# Patient Record
Sex: FEMALE | Race: WHITE | Employment: FULL TIME | URBAN - METROPOLITAN AREA
[De-identification: names, ages, dates, MRNs, and addresses within clinical notes are randomized per-mention and may not be internally consistent; named-entity substitution may affect disease eponyms.]

---

## 2018-06-27 ENCOUNTER — HOSPITAL ENCOUNTER (EMERGENCY)
Age: 53
Discharge: HOME | End: 2018-06-27
Payer: COMMERCIAL

## 2018-06-27 VITALS
DIASTOLIC BLOOD PRESSURE: 104 MMHG | WEIGHT: 195 LBS | BODY MASS INDEX: 35.67 KG/M2 | TEMPERATURE: 98.3 F | RESPIRATION RATE: 14 BRPM | SYSTOLIC BLOOD PRESSURE: 173 MMHG | OXYGEN SATURATION: 99 % | HEART RATE: 100 BPM

## 2018-06-27 DIAGNOSIS — R10.9 FLANK PAIN: Primary | ICD-10-CM

## 2018-06-27 DIAGNOSIS — Z87.442 HISTORY OF KIDNEY STONES: ICD-10-CM

## 2018-06-27 LAB
BACTERIA: ABNORMAL /HPF
BILIRUBIN URINE: NEGATIVE
BLOOD, URINE: ABNORMAL
CLARITY: CLEAR
COLOR: YELLOW
EPITHELIAL CELLS, UA: ABNORMAL /HPF
GLUCOSE URINE: NEGATIVE MG/DL
KETONES, URINE: NEGATIVE MG/DL
LEUKOCYTE ESTERASE, URINE: ABNORMAL
NITRITE, URINE: NEGATIVE
PH UA: 5 (ref 5–9)
PROTEIN UA: 30 MG/DL
RBC UA: ABNORMAL /HPF (ref 0–2)
SPECIFIC GRAVITY UA: 1.01 (ref 1–1.03)
UROBILINOGEN, URINE: 0.2 E.U./DL
WBC UA: ABNORMAL /HPF (ref 0–5)

## 2018-06-27 PROCEDURE — 99212 OFFICE O/P EST SF 10 MIN: CPT

## 2018-06-27 PROCEDURE — 81001 URINALYSIS AUTO W/SCOPE: CPT

## 2018-06-27 RX ORDER — TAMSULOSIN HYDROCHLORIDE 0.4 MG/1
0.4 CAPSULE ORAL DAILY
Qty: 30 CAPSULE | Refills: 0 | Status: SHIPPED | OUTPATIENT
Start: 2018-06-27 | End: 2020-09-17 | Stop reason: ALTCHOICE

## 2018-06-27 RX ORDER — KETOROLAC TROMETHAMINE 10 MG/1
10 TABLET, FILM COATED ORAL EVERY 6 HOURS PRN
Qty: 20 TABLET | Refills: 0 | Status: ON HOLD | OUTPATIENT
Start: 2018-06-27 | End: 2018-07-10 | Stop reason: HOSPADM

## 2018-06-27 ASSESSMENT — PAIN SCALES - GENERAL: PAINLEVEL_OUTOF10: 5

## 2018-06-27 ASSESSMENT — PAIN DESCRIPTION - FREQUENCY: FREQUENCY: CONTINUOUS

## 2018-06-27 ASSESSMENT — PAIN DESCRIPTION - ORIENTATION: ORIENTATION: RIGHT

## 2018-06-27 ASSESSMENT — PAIN DESCRIPTION - LOCATION: LOCATION: FLANK

## 2018-06-27 ASSESSMENT — PAIN DESCRIPTION - DESCRIPTORS: DESCRIPTORS: ACHING

## 2018-06-27 ASSESSMENT — PAIN DESCRIPTION - PAIN TYPE: TYPE: ACUTE PAIN

## 2018-07-07 ENCOUNTER — HOSPITAL ENCOUNTER (INPATIENT)
Age: 53
LOS: 3 days | Discharge: HOME | DRG: 872 | End: 2018-07-10
Attending: EMERGENCY MEDICINE | Admitting: INTERNAL MEDICINE
Payer: COMMERCIAL

## 2018-07-07 ENCOUNTER — ANESTHESIA (OUTPATIENT)
Dept: OPERATING ROOM | Age: 53
DRG: 872 | End: 2018-07-07
Payer: COMMERCIAL

## 2018-07-07 ENCOUNTER — ANESTHESIA EVENT (OUTPATIENT)
Dept: OPERATING ROOM | Age: 53
DRG: 872 | End: 2018-07-07
Payer: COMMERCIAL

## 2018-07-07 ENCOUNTER — APPOINTMENT (OUTPATIENT)
Dept: GENERAL RADIOLOGY | Age: 53
DRG: 872 | End: 2018-07-07
Payer: COMMERCIAL

## 2018-07-07 ENCOUNTER — APPOINTMENT (OUTPATIENT)
Dept: CT IMAGING | Age: 53
DRG: 872 | End: 2018-07-07
Payer: COMMERCIAL

## 2018-07-07 DIAGNOSIS — N10 ACUTE PYELONEPHRITIS: ICD-10-CM

## 2018-07-07 DIAGNOSIS — N13.9 ACUTE BILATERAL OBSTRUCTIVE UROPATHY: Primary | ICD-10-CM

## 2018-07-07 DIAGNOSIS — A41.9 SEPSIS, DUE TO UNSPECIFIED ORGANISM: ICD-10-CM

## 2018-07-07 DIAGNOSIS — N17.9 ACUTE RENAL FAILURE, UNSPECIFIED ACUTE RENAL FAILURE TYPE (HCC): ICD-10-CM

## 2018-07-07 DIAGNOSIS — N20.0 NEPHROLITHIASIS: ICD-10-CM

## 2018-07-07 DIAGNOSIS — N23 RENAL COLIC: ICD-10-CM

## 2018-07-07 DIAGNOSIS — E87.5 HYPERKALEMIA: ICD-10-CM

## 2018-07-07 DIAGNOSIS — R52 PAIN: ICD-10-CM

## 2018-07-07 LAB
ANION GAP SERPL CALCULATED.3IONS-SCNC: 43 MMOL/L (ref 7–16)
BASOPHILS ABSOLUTE: 0.01 E9/L (ref 0–0.2)
BASOPHILS RELATIVE PERCENT: 0.1 % (ref 0–2)
BUN BLDV-MCNC: 102 MG/DL (ref 6–20)
CALCIUM SERPL-MCNC: 8.8 MG/DL (ref 8.6–10.2)
CHLORIDE BLD-SCNC: 79 MMOL/L (ref 98–107)
CHP ED QC CHECK: YES
CO2: 8 MMOL/L (ref 22–29)
CREAT SERPL-MCNC: 9.5 MG/DL (ref 0.5–1)
EOSINOPHILS ABSOLUTE: 0 E9/L (ref 0.05–0.5)
EOSINOPHILS RELATIVE PERCENT: 0 % (ref 0–6)
GFR AFRICAN AMERICAN: 5
GFR NON-AFRICAN AMERICAN: 4 ML/MIN/1.73
GLUCOSE BLD-MCNC: 162 MG/DL (ref 74–109)
GLUCOSE BLD-MCNC: 208 MG/DL
HCT VFR BLD CALC: 31.2 % (ref 34–48)
HEMOGLOBIN: 10.8 G/DL (ref 11.5–15.5)
IMMATURE GRANULOCYTES #: 0.07 E9/L
IMMATURE GRANULOCYTES %: 0.5 % (ref 0–5)
LACTIC ACID: 8.8 MMOL/L (ref 0.5–2.2)
LYMPHOCYTES ABSOLUTE: 1.25 E9/L (ref 1.5–4)
LYMPHOCYTES RELATIVE PERCENT: 8.6 % (ref 20–42)
MCH RBC QN AUTO: 29.4 PG (ref 26–35)
MCHC RBC AUTO-ENTMCNC: 34.6 % (ref 32–34.5)
MCV RBC AUTO: 85 FL (ref 80–99.9)
MONOCYTES ABSOLUTE: 1.13 E9/L (ref 0.1–0.95)
MONOCYTES RELATIVE PERCENT: 7.8 % (ref 2–12)
NEUTROPHILS ABSOLUTE: 12.09 E9/L (ref 1.8–7.3)
NEUTROPHILS RELATIVE PERCENT: 83 % (ref 43–80)
PDW BLD-RTO: 12.9 FL (ref 11.5–15)
PLATELET # BLD: 300 E9/L (ref 130–450)
PMV BLD AUTO: 9.8 FL (ref 7–12)
POC ANION GAP: 28
POC BUN: 97
POC CHLORIDE: 97
POC CO2: 13
POC CREATININE: 9.3
POC POTASSIUM: 6
POC SODIUM: 131
POTASSIUM REFLEX MAGNESIUM: 7.3 MMOL/L (ref 3.5–5)
RBC # BLD: 3.67 E12/L (ref 3.5–5.5)
SODIUM BLD-SCNC: 130 MMOL/L (ref 132–146)
WBC # BLD: 14.6 E9/L (ref 4.5–11.5)

## 2018-07-07 PROCEDURE — 6360000002 HC RX W HCPCS: Performed by: EMERGENCY MEDICINE

## 2018-07-07 PROCEDURE — 2500000003 HC RX 250 WO HCPCS: Performed by: EMERGENCY MEDICINE

## 2018-07-07 PROCEDURE — 84484 ASSAY OF TROPONIN QUANT: CPT

## 2018-07-07 PROCEDURE — 7100000000 HC PACU RECOVERY - FIRST 15 MIN: Performed by: UROLOGY

## 2018-07-07 PROCEDURE — 3600000003 HC SURGERY LEVEL 3 BASE: Performed by: UROLOGY

## 2018-07-07 PROCEDURE — BT141ZZ FLUOROSCOPY OF KIDNEYS, URETERS AND BLADDER USING LOW OSMOLAR CONTRAST: ICD-10-PCS | Performed by: UROLOGY

## 2018-07-07 PROCEDURE — 2580000003 HC RX 258: Performed by: NURSE ANESTHETIST, CERTIFIED REGISTERED

## 2018-07-07 PROCEDURE — 3700000001 HC ADD 15 MINUTES (ANESTHESIA): Performed by: UROLOGY

## 2018-07-07 PROCEDURE — 99292 CRITICAL CARE ADDL 30 MIN: CPT

## 2018-07-07 PROCEDURE — 96375 TX/PRO/DX INJ NEW DRUG ADDON: CPT

## 2018-07-07 PROCEDURE — 74176 CT ABD & PELVIS W/O CONTRAST: CPT

## 2018-07-07 PROCEDURE — 87088 URINE BACTERIA CULTURE: CPT

## 2018-07-07 PROCEDURE — 3700000000 HC ANESTHESIA ATTENDED CARE: Performed by: UROLOGY

## 2018-07-07 PROCEDURE — 87040 BLOOD CULTURE FOR BACTERIA: CPT

## 2018-07-07 PROCEDURE — 2580000003 HC RX 258: Performed by: EMERGENCY MEDICINE

## 2018-07-07 PROCEDURE — 85025 COMPLETE CBC W/AUTO DIFF WBC: CPT

## 2018-07-07 PROCEDURE — 6360000004 HC RX CONTRAST MEDICATION: Performed by: UROLOGY

## 2018-07-07 PROCEDURE — 96374 THER/PROPH/DIAG INJ IV PUSH: CPT

## 2018-07-07 PROCEDURE — C2617 STENT, NON-COR, TEM W/O DEL: HCPCS | Performed by: UROLOGY

## 2018-07-07 PROCEDURE — 3600000013 HC SURGERY LEVEL 3 ADDTL 15MIN: Performed by: UROLOGY

## 2018-07-07 PROCEDURE — 74400 UROGRAPHY IV +-KUB TOMOG: CPT

## 2018-07-07 PROCEDURE — 80048 BASIC METABOLIC PNL TOTAL CA: CPT

## 2018-07-07 PROCEDURE — 94640 AIRWAY INHALATION TREATMENT: CPT

## 2018-07-07 PROCEDURE — 6360000002 HC RX W HCPCS: Performed by: NURSE ANESTHETIST, CERTIFIED REGISTERED

## 2018-07-07 PROCEDURE — 51702 INSERT TEMP BLADDER CATH: CPT

## 2018-07-07 PROCEDURE — 36415 COLL VENOUS BLD VENIPUNCTURE: CPT

## 2018-07-07 PROCEDURE — 2709999900 HC NON-CHARGEABLE SUPPLY: Performed by: UROLOGY

## 2018-07-07 PROCEDURE — 99291 CRITICAL CARE FIRST HOUR: CPT

## 2018-07-07 PROCEDURE — 93005 ELECTROCARDIOGRAM TRACING: CPT | Performed by: EMERGENCY MEDICINE

## 2018-07-07 PROCEDURE — 83605 ASSAY OF LACTIC ACID: CPT

## 2018-07-07 PROCEDURE — C1758 CATHETER, URETERAL: HCPCS | Performed by: UROLOGY

## 2018-07-07 PROCEDURE — 6370000000 HC RX 637 (ALT 250 FOR IP): Performed by: EMERGENCY MEDICINE

## 2018-07-07 PROCEDURE — 7100000001 HC PACU RECOVERY - ADDTL 15 MIN: Performed by: UROLOGY

## 2018-07-07 PROCEDURE — 2000000000 HC ICU R&B

## 2018-07-07 PROCEDURE — 0T788DZ DILATION OF BILATERAL URETERS WITH INTRALUMINAL DEVICE, VIA NATURAL OR ARTIFICIAL OPENING ENDOSCOPIC: ICD-10-PCS | Performed by: UROLOGY

## 2018-07-07 DEVICE — UNIVERSA FIRM URETERAL STENT AND POSITIONER WITH HYDROPHILIC COATING
Type: IMPLANTABLE DEVICE | Site: URETER | Status: FUNCTIONAL
Brand: UNIVERSA

## 2018-07-07 RX ORDER — PROPOFOL 10 MG/ML
INJECTION, EMULSION INTRAVENOUS PRN
Status: DISCONTINUED | OUTPATIENT
Start: 2018-07-07 | End: 2018-07-08 | Stop reason: SDUPTHER

## 2018-07-07 RX ORDER — DEXTROSE MONOHYDRATE 25 G/50ML
50 INJECTION, SOLUTION INTRAVENOUS ONCE
Status: COMPLETED | OUTPATIENT
Start: 2018-07-07 | End: 2018-07-07

## 2018-07-07 RX ORDER — PROPOFOL 10 MG/ML
INJECTION, EMULSION INTRAVENOUS CONTINUOUS PRN
Status: DISCONTINUED | OUTPATIENT
Start: 2018-07-07 | End: 2018-07-08 | Stop reason: SDUPTHER

## 2018-07-07 RX ORDER — 0.9 % SODIUM CHLORIDE 0.9 %
1000 INTRAVENOUS SOLUTION INTRAVENOUS ONCE
Status: DISCONTINUED | OUTPATIENT
Start: 2018-07-07 | End: 2018-07-07

## 2018-07-07 RX ORDER — SODIUM CHLORIDE 9 MG/ML
INJECTION, SOLUTION INTRAVENOUS CONTINUOUS
Status: CANCELLED | OUTPATIENT
Start: 2018-07-07

## 2018-07-07 RX ORDER — ONDANSETRON 2 MG/ML
4 INJECTION INTRAMUSCULAR; INTRAVENOUS ONCE
Status: COMPLETED | OUTPATIENT
Start: 2018-07-07 | End: 2018-07-07

## 2018-07-07 RX ORDER — FENTANYL CITRATE 50 UG/ML
50 INJECTION, SOLUTION INTRAMUSCULAR; INTRAVENOUS ONCE
Status: COMPLETED | OUTPATIENT
Start: 2018-07-07 | End: 2018-07-07

## 2018-07-07 RX ORDER — 0.9 % SODIUM CHLORIDE 0.9 %
30 INTRAVENOUS SOLUTION INTRAVENOUS ONCE
Status: COMPLETED | OUTPATIENT
Start: 2018-07-07 | End: 2018-07-07

## 2018-07-07 RX ORDER — SODIUM POLYSTYRENE SULFONATE 15 G/60ML
SUSPENSION ORAL; RECTAL
Status: COMPLETED
Start: 2018-07-07 | End: 2018-07-08

## 2018-07-07 RX ORDER — SODIUM CHLORIDE 9 MG/ML
INJECTION, SOLUTION INTRAVENOUS CONTINUOUS
Status: DISCONTINUED | OUTPATIENT
Start: 2018-07-07 | End: 2018-07-07

## 2018-07-07 RX ORDER — ALBUTEROL SULFATE 2.5 MG/3ML
10 SOLUTION RESPIRATORY (INHALATION) ONCE
Status: COMPLETED | OUTPATIENT
Start: 2018-07-07 | End: 2018-07-07

## 2018-07-07 RX ORDER — SODIUM POLYSTYRENE SULFONATE 15 G/60ML
30 SUSPENSION ORAL; RECTAL ONCE
Status: COMPLETED | OUTPATIENT
Start: 2018-07-07 | End: 2018-07-07

## 2018-07-07 RX ORDER — SODIUM CHLORIDE 0.9 % (FLUSH) 0.9 %
10 SYRINGE (ML) INJECTION PRN
Status: DISCONTINUED | OUTPATIENT
Start: 2018-07-07 | End: 2018-07-08 | Stop reason: SDUPTHER

## 2018-07-07 RX ADMIN — SODIUM BICARBONATE 50 MEQ: 84 INJECTION INTRAVENOUS at 22:32

## 2018-07-07 RX ADMIN — SODIUM CHLORIDE 1000 ML: 9 INJECTION, SOLUTION INTRAVENOUS at 21:16

## 2018-07-07 RX ADMIN — DEXTROSE MONOHYDRATE 50 ML: 25 INJECTION, SOLUTION INTRAVENOUS at 22:34

## 2018-07-07 RX ADMIN — PROPOFOL 150 MCG/KG/MIN: 10 INJECTION, EMULSION INTRAVENOUS at 23:41

## 2018-07-07 RX ADMIN — ALBUTEROL SULFATE 10 MG: 2.5 SOLUTION RESPIRATORY (INHALATION) at 22:22

## 2018-07-07 RX ADMIN — LIDOCAINE HYDROCHLORIDE 50 MG: 20 INJECTION, SOLUTION INTRAVENOUS at 23:41

## 2018-07-07 RX ADMIN — SODIUM BICARBONATE 50 MEQ: 84 INJECTION INTRAVENOUS at 22:37

## 2018-07-07 RX ADMIN — SODIUM BICARBONATE 50 MEQ: 84 INJECTION INTRAVENOUS at 22:42

## 2018-07-07 RX ADMIN — SODIUM CHLORIDE 2979 ML: 9 INJECTION, SOLUTION INTRAVENOUS at 22:33

## 2018-07-07 RX ADMIN — SODIUM BICARBONATE: 84 INJECTION, SOLUTION INTRAVENOUS at 22:59

## 2018-07-07 RX ADMIN — INSULIN HUMAN 10 UNITS: 100 INJECTION, SOLUTION PARENTERAL at 22:32

## 2018-07-07 RX ADMIN — FENTANYL CITRATE 50 MCG: 50 INJECTION INTRAMUSCULAR; INTRAVENOUS at 22:42

## 2018-07-07 RX ADMIN — CEFEPIME HYDROCHLORIDE 2 G: 2 INJECTION, POWDER, FOR SOLUTION INTRAVENOUS at 22:16

## 2018-07-07 RX ADMIN — ONDANSETRON 4 MG: 2 INJECTION, SOLUTION INTRAMUSCULAR; INTRAVENOUS at 21:17

## 2018-07-07 RX ADMIN — SODIUM POLYSTYRENE SULFONATE 30 G: 15 SUSPENSION ORAL; RECTAL at 22:45

## 2018-07-07 RX ADMIN — PHENYLEPHRINE HYDROCHLORIDE 100 MCG: 10 INJECTION INTRAVENOUS at 23:54

## 2018-07-07 RX ADMIN — PROPOFOL 60 MG: 10 INJECTION, EMULSION INTRAVENOUS at 23:41

## 2018-07-07 RX ADMIN — SODIUM CHLORIDE: 9 INJECTION, SOLUTION INTRAVENOUS at 23:34

## 2018-07-07 ASSESSMENT — PULMONARY FUNCTION TESTS
PIF_VALUE: 1
PIF_VALUE: 2
PIF_VALUE: 0
PIF_VALUE: 1
PIF_VALUE: 0
PIF_VALUE: 1

## 2018-07-07 ASSESSMENT — PAIN SCALES - GENERAL
PAINLEVEL_OUTOF10: 6
PAINLEVEL_OUTOF10: 6

## 2018-07-07 ASSESSMENT — PAIN DESCRIPTION - PAIN TYPE: TYPE: ACUTE PAIN

## 2018-07-07 ASSESSMENT — ENCOUNTER SYMPTOMS
EYE PAIN: 0
COUGH: 0
VOMITING: 1
SINUS PRESSURE: 0
SHORTNESS OF BREATH: 0
WHEEZING: 0
BACK PAIN: 0
ABDOMINAL DISTENTION: 0
SORE THROAT: 0
DIARRHEA: 0
EYE REDNESS: 0
EYE DISCHARGE: 0
NAUSEA: 1

## 2018-07-07 ASSESSMENT — PAIN DESCRIPTION - LOCATION: LOCATION: THROAT;GENERALIZED

## 2018-07-07 ASSESSMENT — PAIN DESCRIPTION - DESCRIPTORS: DESCRIPTORS: SORE

## 2018-07-08 VITALS
DIASTOLIC BLOOD PRESSURE: 55 MMHG | RESPIRATION RATE: 16 BRPM | SYSTOLIC BLOOD PRESSURE: 122 MMHG | OXYGEN SATURATION: 100 %

## 2018-07-08 LAB
ALBUMIN SERPL-MCNC: 3 G/DL (ref 3.5–5.2)
ALP BLD-CCNC: 56 U/L (ref 35–104)
ALT SERPL-CCNC: 12 U/L (ref 0–32)
ANION GAP SERPL CALCULATED.3IONS-SCNC: 14 MMOL/L (ref 7–16)
ANION GAP SERPL CALCULATED.3IONS-SCNC: 17 MMOL/L (ref 7–16)
ANION GAP SERPL CALCULATED.3IONS-SCNC: 31 MMOL/L (ref 7–16)
ANION GAP SERPL CALCULATED.3IONS-SCNC: 43 MMOL/L (ref 7–16)
AST SERPL-CCNC: 13 U/L (ref 0–31)
BACTERIA: ABNORMAL /HPF
BASOPHILS ABSOLUTE: 0 E9/L (ref 0–0.2)
BASOPHILS RELATIVE PERCENT: 0.1 % (ref 0–2)
BILIRUB SERPL-MCNC: 0.2 MG/DL (ref 0–1.2)
BILIRUBIN URINE: NEGATIVE
BLOOD, URINE: ABNORMAL
BUN BLDV-MCNC: 52 MG/DL (ref 6–20)
BUN BLDV-MCNC: 66 MG/DL (ref 6–20)
BUN BLDV-MCNC: 85 MG/DL (ref 6–20)
BUN BLDV-MCNC: 92 MG/DL (ref 6–20)
BURR CELLS: ABNORMAL
CALCIUM SERPL-MCNC: 7.2 MG/DL (ref 8.6–10.2)
CALCIUM SERPL-MCNC: 7.3 MG/DL (ref 8.6–10.2)
CALCIUM SERPL-MCNC: 7.4 MG/DL (ref 8.6–10.2)
CALCIUM SERPL-MCNC: 7.5 MG/DL (ref 8.6–10.2)
CHLORIDE BLD-SCNC: 90 MMOL/L (ref 98–107)
CHLORIDE BLD-SCNC: 93 MMOL/L (ref 98–107)
CHLORIDE BLD-SCNC: 99 MMOL/L (ref 98–107)
CHLORIDE BLD-SCNC: 99 MMOL/L (ref 98–107)
CHLORIDE URINE RANDOM: 42 MMOL/L
CHOLESTEROL, TOTAL: 158 MG/DL (ref 0–199)
CLARITY: ABNORMAL
CO2: 16 MMOL/L (ref 22–29)
CO2: 29 MMOL/L (ref 22–29)
CO2: 29 MMOL/L (ref 22–29)
CO2: 6 MMOL/L (ref 22–29)
COLOR: ABNORMAL
CREAT SERPL-MCNC: 3.1 MG/DL (ref 0.5–1)
CREAT SERPL-MCNC: 4.3 MG/DL (ref 0.5–1)
CREAT SERPL-MCNC: 6.7 MG/DL (ref 0.5–1)
CREAT SERPL-MCNC: 7.9 MG/DL (ref 0.5–1)
CREATININE URINE: 65 MG/DL (ref 29–226)
EOSINOPHIL, URINE: 0 % (ref 0–1)
EOSINOPHILS ABSOLUTE: 0 E9/L (ref 0.05–0.5)
EOSINOPHILS RELATIVE PERCENT: 0 % (ref 0–6)
EPITHELIAL CELLS, UA: ABNORMAL /HPF
GFR AFRICAN AMERICAN: 13
GFR AFRICAN AMERICAN: 19
GFR AFRICAN AMERICAN: 7
GFR AFRICAN AMERICAN: 8
GFR NON-AFRICAN AMERICAN: 11 ML/MIN/1.73
GFR NON-AFRICAN AMERICAN: 16 ML/MIN/1.73
GFR NON-AFRICAN AMERICAN: 5 ML/MIN/1.73
GFR NON-AFRICAN AMERICAN: 7 ML/MIN/1.73
GLUCOSE BLD-MCNC: 136 MG/DL (ref 74–109)
GLUCOSE BLD-MCNC: 166 MG/DL (ref 74–109)
GLUCOSE BLD-MCNC: 183 MG/DL (ref 74–109)
GLUCOSE BLD-MCNC: 185 MG/DL (ref 74–109)
GLUCOSE URINE: NEGATIVE MG/DL
HBA1C MFR BLD: 7 % (ref 4–5.6)
HCT VFR BLD CALC: 26.1 % (ref 34–48)
HDLC SERPL-MCNC: 56 MG/DL
HEMOGLOBIN: 8.8 G/DL (ref 11.5–15.5)
KETONES, URINE: 15 MG/DL
LDL CHOLESTEROL CALCULATED: 83 MG/DL (ref 0–99)
LEUKOCYTE ESTERASE, URINE: ABNORMAL
LYMPHOCYTES ABSOLUTE: 1.63 E9/L (ref 1.5–4)
LYMPHOCYTES RELATIVE PERCENT: 12.6 % (ref 20–42)
MAGNESIUM: 1.8 MG/DL (ref 1.6–2.6)
MCH RBC QN AUTO: 28.5 PG (ref 26–35)
MCHC RBC AUTO-ENTMCNC: 33.7 % (ref 32–34.5)
MCV RBC AUTO: 84.5 FL (ref 80–99.9)
METAMYELOCYTES RELATIVE PERCENT: 0.5 % (ref 0–1)
METER GLUCOSE: 125 MG/DL (ref 70–110)
METER GLUCOSE: 136 MG/DL (ref 70–110)
METER GLUCOSE: 145 MG/DL (ref 70–110)
METER GLUCOSE: 148 MG/DL (ref 70–110)
METER GLUCOSE: 201 MG/DL (ref 70–110)
METER GLUCOSE: 213 MG/DL (ref 70–110)
MONOCYTES ABSOLUTE: 0.5 E9/L (ref 0.1–0.95)
MONOCYTES RELATIVE PERCENT: 3.5 % (ref 2–12)
NEUTROPHILS ABSOLUTE: 10.5 E9/L (ref 1.8–7.3)
NEUTROPHILS RELATIVE PERCENT: 83.4 % (ref 43–80)
NITRITE, URINE: NEGATIVE
PDW BLD-RTO: 13 FL (ref 11.5–15)
PH UA: 5 (ref 5–9)
PHOSPHORUS: 7.1 MG/DL (ref 2.5–4.5)
PLATELET # BLD: 250 E9/L (ref 130–450)
PMV BLD AUTO: 9.8 FL (ref 7–12)
POIKILOCYTES: ABNORMAL
POTASSIUM SERPL-SCNC: 4 MMOL/L (ref 3.5–5)
POTASSIUM SERPL-SCNC: 4.2 MMOL/L (ref 3.5–5)
POTASSIUM SERPL-SCNC: 4.8 MMOL/L (ref 3.5–5)
POTASSIUM SERPL-SCNC: 5.6 MMOL/L (ref 3.5–5)
PROTEIN PROTEIN: 52 MG/DL (ref 0–12)
PROTEIN UA: 100 MG/DL
PROTEIN/CREAT RATIO: 0.8
PROTEIN/CREAT RATIO: 0.8 (ref 0–0.2)
RBC # BLD: 3.09 E12/L (ref 3.5–5.5)
RBC UA: >20 /HPF (ref 0–2)
SODIUM BLD-SCNC: 139 MMOL/L (ref 132–146)
SODIUM BLD-SCNC: 140 MMOL/L (ref 132–146)
SODIUM BLD-SCNC: 142 MMOL/L (ref 132–146)
SODIUM BLD-SCNC: 145 MMOL/L (ref 132–146)
SODIUM URINE: 90 MMOL/L
SPECIFIC GRAVITY UA: 1.02 (ref 1–1.03)
TOTAL PROTEIN: 5.8 G/DL (ref 6.4–8.3)
TRIGL SERPL-MCNC: 96 MG/DL (ref 0–149)
UROBILINOGEN, URINE: 0.2 E.U./DL
VLDLC SERPL CALC-MCNC: 19 MG/DL
WBC # BLD: 12.5 E9/L (ref 4.5–11.5)
WBC UA: ABNORMAL /HPF (ref 0–5)

## 2018-07-08 PROCEDURE — 2500000003 HC RX 250 WO HCPCS

## 2018-07-08 PROCEDURE — 2580000003 HC RX 258: Performed by: INTERNAL MEDICINE

## 2018-07-08 PROCEDURE — 80061 LIPID PANEL: CPT

## 2018-07-08 PROCEDURE — 83036 HEMOGLOBIN GLYCOSYLATED A1C: CPT

## 2018-07-08 PROCEDURE — 83735 ASSAY OF MAGNESIUM: CPT

## 2018-07-08 PROCEDURE — 81001 URINALYSIS AUTO W/SCOPE: CPT

## 2018-07-08 PROCEDURE — 2000000000 HC ICU R&B

## 2018-07-08 PROCEDURE — 87205 SMEAR GRAM STAIN: CPT

## 2018-07-08 PROCEDURE — 82570 ASSAY OF URINE CREATININE: CPT

## 2018-07-08 PROCEDURE — 6360000002 HC RX W HCPCS: Performed by: UROLOGY

## 2018-07-08 PROCEDURE — 6370000000 HC RX 637 (ALT 250 FOR IP): Performed by: INTERNAL MEDICINE

## 2018-07-08 PROCEDURE — 82436 ASSAY OF URINE CHLORIDE: CPT

## 2018-07-08 PROCEDURE — 36415 COLL VENOUS BLD VENIPUNCTURE: CPT

## 2018-07-08 PROCEDURE — 6360000002 HC RX W HCPCS: Performed by: INTERNAL MEDICINE

## 2018-07-08 PROCEDURE — 93005 ELECTROCARDIOGRAM TRACING: CPT | Performed by: INTERNAL MEDICINE

## 2018-07-08 PROCEDURE — 6360000002 HC RX W HCPCS: Performed by: NURSE ANESTHETIST, CERTIFIED REGISTERED

## 2018-07-08 PROCEDURE — 6360000002 HC RX W HCPCS

## 2018-07-08 PROCEDURE — 84156 ASSAY OF PROTEIN URINE: CPT

## 2018-07-08 PROCEDURE — 80048 BASIC METABOLIC PNL TOTAL CA: CPT

## 2018-07-08 PROCEDURE — 80053 COMPREHEN METABOLIC PANEL: CPT

## 2018-07-08 PROCEDURE — 6370000000 HC RX 637 (ALT 250 FOR IP)

## 2018-07-08 PROCEDURE — 82962 GLUCOSE BLOOD TEST: CPT

## 2018-07-08 PROCEDURE — 2580000003 HC RX 258

## 2018-07-08 PROCEDURE — 85025 COMPLETE CBC W/AUTO DIFF WBC: CPT

## 2018-07-08 PROCEDURE — 84300 ASSAY OF URINE SODIUM: CPT

## 2018-07-08 PROCEDURE — 6360000002 HC RX W HCPCS: Performed by: ANESTHESIOLOGY

## 2018-07-08 PROCEDURE — 84100 ASSAY OF PHOSPHORUS: CPT

## 2018-07-08 PROCEDURE — 87081 CULTURE SCREEN ONLY: CPT

## 2018-07-08 PROCEDURE — 94150 VITAL CAPACITY TEST: CPT

## 2018-07-08 RX ORDER — SODIUM CHLORIDE 0.9 % (FLUSH) 0.9 %
10 SYRINGE (ML) INJECTION PRN
Status: DISCONTINUED | OUTPATIENT
Start: 2018-07-08 | End: 2018-07-08 | Stop reason: SDUPTHER

## 2018-07-08 RX ORDER — ATROPA BELLADONNA AND OPIUM 16.2; 6 MG/1; MG/1
SUPPOSITORY RECTAL
Status: COMPLETED
Start: 2018-07-08 | End: 2018-07-08

## 2018-07-08 RX ORDER — SODIUM CHLORIDE 0.9 % (FLUSH) 0.9 %
10 SYRINGE (ML) INJECTION EVERY 12 HOURS SCHEDULED
Status: DISCONTINUED | OUTPATIENT
Start: 2018-07-08 | End: 2018-07-08 | Stop reason: SDUPTHER

## 2018-07-08 RX ORDER — NICOTINE POLACRILEX 4 MG
15 LOZENGE BUCCAL PRN
Status: DISCONTINUED | OUTPATIENT
Start: 2018-07-08 | End: 2018-07-10 | Stop reason: HOSPADM

## 2018-07-08 RX ORDER — MEPERIDINE HYDROCHLORIDE 25 MG/ML
12.5 INJECTION INTRAMUSCULAR; INTRAVENOUS; SUBCUTANEOUS EVERY 5 MIN PRN
Status: DISCONTINUED | OUTPATIENT
Start: 2018-07-08 | End: 2018-07-08

## 2018-07-08 RX ORDER — MORPHINE SULFATE 2 MG/ML
2 INJECTION, SOLUTION INTRAMUSCULAR; INTRAVENOUS
Status: DISCONTINUED | OUTPATIENT
Start: 2018-07-08 | End: 2018-07-10 | Stop reason: HOSPADM

## 2018-07-08 RX ORDER — SODIUM CHLORIDE 0.9 % (FLUSH) 0.9 %
10 SYRINGE (ML) INJECTION PRN
Status: DISCONTINUED | OUTPATIENT
Start: 2018-07-08 | End: 2018-07-10 | Stop reason: HOSPADM

## 2018-07-08 RX ORDER — SODIUM CHLORIDE 450 MG/100ML
INJECTION, SOLUTION INTRAVENOUS CONTINUOUS
Status: DISCONTINUED | OUTPATIENT
Start: 2018-07-08 | End: 2018-07-09 | Stop reason: ALTCHOICE

## 2018-07-08 RX ORDER — HYDROCODONE BITARTRATE AND ACETAMINOPHEN 5; 325 MG/1; MG/1
2 TABLET ORAL EVERY 4 HOURS PRN
Status: DISCONTINUED | OUTPATIENT
Start: 2018-07-08 | End: 2018-07-10 | Stop reason: HOSPADM

## 2018-07-08 RX ORDER — DEXTROSE MONOHYDRATE 50 MG/ML
100 INJECTION, SOLUTION INTRAVENOUS PRN
Status: DISCONTINUED | OUTPATIENT
Start: 2018-07-08 | End: 2018-07-10 | Stop reason: HOSPADM

## 2018-07-08 RX ORDER — SODIUM CHLORIDE 9 MG/ML
INJECTION, SOLUTION INTRAVENOUS CONTINUOUS PRN
Status: DISCONTINUED | OUTPATIENT
Start: 2018-07-07 | End: 2018-07-08 | Stop reason: SDUPTHER

## 2018-07-08 RX ORDER — LEVOTHYROXINE SODIUM 0.05 MG/1
50 TABLET ORAL DAILY
Status: DISCONTINUED | OUTPATIENT
Start: 2018-07-08 | End: 2018-07-10 | Stop reason: HOSPADM

## 2018-07-08 RX ORDER — HYDROCODONE BITARTRATE AND ACETAMINOPHEN 5; 325 MG/1; MG/1
1 TABLET ORAL EVERY 4 HOURS PRN
Status: DISCONTINUED | OUTPATIENT
Start: 2018-07-08 | End: 2018-07-10 | Stop reason: HOSPADM

## 2018-07-08 RX ORDER — SODIUM CHLORIDE 9 MG/ML
INJECTION, SOLUTION INTRAVENOUS CONTINUOUS
Status: DISCONTINUED | OUTPATIENT
Start: 2018-07-08 | End: 2018-07-08

## 2018-07-08 RX ORDER — DEXTROSE MONOHYDRATE 25 G/50ML
12.5 INJECTION, SOLUTION INTRAVENOUS PRN
Status: DISCONTINUED | OUTPATIENT
Start: 2018-07-08 | End: 2018-07-10 | Stop reason: HOSPADM

## 2018-07-08 RX ORDER — ATROPA BELLADONNA AND OPIUM 16.2; 6 MG/1; MG/1
60 SUPPOSITORY RECTAL EVERY 8 HOURS PRN
Status: COMPLETED | OUTPATIENT
Start: 2018-07-08 | End: 2018-07-08

## 2018-07-08 RX ORDER — INSULIN GLARGINE 100 [IU]/ML
10 INJECTION, SOLUTION SUBCUTANEOUS
Status: DISCONTINUED | OUTPATIENT
Start: 2018-07-08 | End: 2018-07-10 | Stop reason: HOSPADM

## 2018-07-08 RX ORDER — MEPERIDINE HYDROCHLORIDE 25 MG/ML
INJECTION INTRAMUSCULAR; INTRAVENOUS; SUBCUTANEOUS
Status: COMPLETED
Start: 2018-07-08 | End: 2018-07-08

## 2018-07-08 RX ORDER — ACETAMINOPHEN 325 MG/1
TABLET ORAL
Status: COMPLETED
Start: 2018-07-08 | End: 2018-07-08

## 2018-07-08 RX ORDER — ONDANSETRON 2 MG/ML
4 INJECTION INTRAMUSCULAR; INTRAVENOUS EVERY 6 HOURS PRN
Status: DISCONTINUED | OUTPATIENT
Start: 2018-07-08 | End: 2018-07-10 | Stop reason: HOSPADM

## 2018-07-08 RX ORDER — ONDANSETRON 2 MG/ML
4 INJECTION INTRAMUSCULAR; INTRAVENOUS
Status: ACTIVE | OUTPATIENT
Start: 2018-07-08 | End: 2018-07-08

## 2018-07-08 RX ORDER — ACETAMINOPHEN 325 MG/1
650 TABLET ORAL EVERY 4 HOURS PRN
Status: DISCONTINUED | OUTPATIENT
Start: 2018-07-08 | End: 2018-07-10 | Stop reason: HOSPADM

## 2018-07-08 RX ORDER — SODIUM CHLORIDE 0.9 % (FLUSH) 0.9 %
10 SYRINGE (ML) INJECTION EVERY 12 HOURS SCHEDULED
Status: DISCONTINUED | OUTPATIENT
Start: 2018-07-08 | End: 2018-07-10 | Stop reason: HOSPADM

## 2018-07-08 RX ORDER — SODIUM CHLORIDE 0.9 % (FLUSH) 0.9 %
SYRINGE (ML) INJECTION
Status: COMPLETED
Start: 2018-07-08 | End: 2018-07-08

## 2018-07-08 RX ORDER — DEXTROSE MONOHYDRATE 25 G/50ML
25 INJECTION, SOLUTION INTRAVENOUS ONCE
Status: COMPLETED | OUTPATIENT
Start: 2018-07-08 | End: 2018-07-08

## 2018-07-08 RX ORDER — ONDANSETRON 2 MG/ML
4 INJECTION INTRAMUSCULAR; INTRAVENOUS EVERY 6 HOURS PRN
Status: DISCONTINUED | OUTPATIENT
Start: 2018-07-08 | End: 2018-07-08 | Stop reason: SDUPTHER

## 2018-07-08 RX ADMIN — INSULIN LISPRO 3 UNITS: 100 INJECTION, SOLUTION INTRAVENOUS; SUBCUTANEOUS at 17:43

## 2018-07-08 RX ADMIN — MEPERIDINE HYDROCHLORIDE 12.5 MG: 25 INJECTION INTRAMUSCULAR; INTRAVENOUS; SUBCUTANEOUS at 00:15

## 2018-07-08 RX ADMIN — INSULIN LISPRO 3 UNITS: 100 INJECTION, SOLUTION INTRAVENOUS; SUBCUTANEOUS at 09:24

## 2018-07-08 RX ADMIN — MEPERIDINE HYDROCHLORIDE 12.5 MG: 25 INJECTION INTRAMUSCULAR; INTRAVENOUS; SUBCUTANEOUS at 00:10

## 2018-07-08 RX ADMIN — CALCIUM GLUCONATE 1 G: 98 INJECTION, SOLUTION INTRAVENOUS at 03:30

## 2018-07-08 RX ADMIN — SODIUM POLYSTYRENE SULFONATE: 15 SUSPENSION ORAL; RECTAL at 02:51

## 2018-07-08 RX ADMIN — ATROPA BELLADONNA AND OPIUM 60 MG: 16.2; 6 SUPPOSITORY RECTAL at 00:40

## 2018-07-08 RX ADMIN — INSULIN LISPRO 1 UNITS: 100 INJECTION, SOLUTION INTRAVENOUS; SUBCUTANEOUS at 13:04

## 2018-07-08 RX ADMIN — PHENYLEPHRINE HYDROCHLORIDE 200 MCG: 10 INJECTION INTRAVENOUS at 00:03

## 2018-07-08 RX ADMIN — HYDROMORPHONE HYDROCHLORIDE 0.5 MG: 1 INJECTION, SOLUTION INTRAMUSCULAR; INTRAVENOUS; SUBCUTANEOUS at 01:03

## 2018-07-08 RX ADMIN — INSULIN HUMAN 6 UNITS: 100 INJECTION, SOLUTION PARENTERAL at 02:48

## 2018-07-08 RX ADMIN — Medication 10 ML: at 00:30

## 2018-07-08 RX ADMIN — MORPHINE SULFATE 2 MG: 2 INJECTION, SOLUTION INTRAMUSCULAR; INTRAVENOUS at 02:18

## 2018-07-08 RX ADMIN — Medication 0.5 MG: at 01:03

## 2018-07-08 RX ADMIN — ACETAMINOPHEN 650 MG: 325 TABLET ORAL at 23:20

## 2018-07-08 RX ADMIN — SODIUM CHLORIDE: 4.5 INJECTION, SOLUTION INTRAVENOUS at 14:39

## 2018-07-08 RX ADMIN — MORPHINE SULFATE 2 MG: 2 INJECTION, SOLUTION INTRAMUSCULAR; INTRAVENOUS at 06:26

## 2018-07-08 RX ADMIN — ACETAMINOPHEN 650 MG: 325 TABLET, FILM COATED ORAL at 23:20

## 2018-07-08 RX ADMIN — SODIUM BICARBONATE 150 MEQ: 84 INJECTION, SOLUTION INTRAVENOUS at 02:50

## 2018-07-08 RX ADMIN — SODIUM CHLORIDE: 9 INJECTION, SOLUTION INTRAVENOUS at 08:29

## 2018-07-08 RX ADMIN — SODIUM CHLORIDE: 4.5 INJECTION, SOLUTION INTRAVENOUS at 21:02

## 2018-07-08 RX ADMIN — DEXTROSE MONOHYDRATE 25 G: 25 INJECTION, SOLUTION INTRAVENOUS at 02:49

## 2018-07-08 RX ADMIN — INSULIN LISPRO 2 UNITS: 100 INJECTION, SOLUTION INTRAVENOUS; SUBCUTANEOUS at 09:29

## 2018-07-08 RX ADMIN — LEVOTHYROXINE SODIUM 50 MCG: 50 TABLET ORAL at 07:07

## 2018-07-08 RX ADMIN — Medication 10 ML: at 20:59

## 2018-07-08 RX ADMIN — INSULIN GLARGINE 10 UNITS: 100 INJECTION, SOLUTION SUBCUTANEOUS at 09:25

## 2018-07-08 RX ADMIN — INSULIN LISPRO 3 UNITS: 100 INJECTION, SOLUTION INTRAVENOUS; SUBCUTANEOUS at 13:09

## 2018-07-08 RX ADMIN — Medication 150 MEQ: at 02:50

## 2018-07-08 ASSESSMENT — PAIN SCALES - GENERAL
PAINLEVEL_OUTOF10: 5
PAINLEVEL_OUTOF10: 0
PAINLEVEL_OUTOF10: 7
PAINLEVEL_OUTOF10: 8
PAINLEVEL_OUTOF10: 0
PAINLEVEL_OUTOF10: 0
PAINLEVEL_OUTOF10: 8
PAINLEVEL_OUTOF10: 0
PAINLEVEL_OUTOF10: 0
PAINLEVEL_OUTOF10: 4
PAINLEVEL_OUTOF10: 3
PAINLEVEL_OUTOF10: 0
PAINLEVEL_OUTOF10: 6
PAINLEVEL_OUTOF10: 8
PAINLEVEL_OUTOF10: 8
PAINLEVEL_OUTOF10: 0
PAINLEVEL_OUTOF10: 8
PAINLEVEL_OUTOF10: 0

## 2018-07-08 ASSESSMENT — PAIN DESCRIPTION - DESCRIPTORS
DESCRIPTORS: HEADACHE;DISCOMFORT;DULL
DESCRIPTORS: PRESSURE;ACHING
DESCRIPTORS: PRESSURE
DESCRIPTORS: PRESSURE
DESCRIPTORS: PRESSURE;DISCOMFORT

## 2018-07-08 ASSESSMENT — PULMONARY FUNCTION TESTS
PIF_VALUE: 2
PIF_VALUE: 1

## 2018-07-08 ASSESSMENT — PAIN DESCRIPTION - ORIENTATION
ORIENTATION: LOWER

## 2018-07-08 ASSESSMENT — PAIN DESCRIPTION - PAIN TYPE
TYPE: SURGICAL PAIN
TYPE: SURGICAL PAIN
TYPE: ACUTE PAIN
TYPE: SURGICAL PAIN

## 2018-07-08 ASSESSMENT — PAIN DESCRIPTION - FREQUENCY
FREQUENCY: INTERMITTENT
FREQUENCY: CONTINUOUS
FREQUENCY: CONTINUOUS

## 2018-07-08 ASSESSMENT — PAIN DESCRIPTION - ONSET
ONSET: ON-GOING
ONSET: PROGRESSIVE

## 2018-07-08 ASSESSMENT — PAIN DESCRIPTION - PROGRESSION
CLINICAL_PROGRESSION: GRADUALLY WORSENING
CLINICAL_PROGRESSION: GRADUALLY WORSENING

## 2018-07-08 ASSESSMENT — PAIN DESCRIPTION - LOCATION
LOCATION: ABDOMEN

## 2018-07-08 NOTE — ANESTHESIA POSTPROCEDURE EVALUATION
Department of Anesthesiology  Postprocedure Note    Patient: Xiomara Aguila  MRN: [id number]  YOB: 1965  Date of evaluation: 7/8/2018  Time:  7:53 AM     Procedure Summary     Date:  07/07/18 Room / Location:  [place] [id number]    Anesthesia Start:  2334 Anesthesia Stop:  07/08/18 0018    Procedure:  CYSTOSCOPY RETROGRADE PYELOGRAM STENT INSERTION (Bilateral Urethra) Diagnosis:  (Acute Renal Failure)    Surgeon:  Hoa Kitchen MD Responsible Provider:  Ethan Dey MD    Anesthesia Type:  MAC ASA Status:  4          Anesthesia Type: MAC    Rory Phase I: Rory Score: 9    Rory Phase II:      Last vitals: Reviewed and per EMR flowsheets.        Anesthesia Post Evaluation    Patient location during evaluation: PACU  Patient participation: complete - patient participated  Level of consciousness: awake and alert  Airway patency: patent  Nausea & Vomiting: no vomiting and no nausea  Complications: no  Cardiovascular status: hemodynamically stable  Respiratory status: acceptable  Hydration status: stable

## 2018-07-08 NOTE — ED PROVIDER NOTES
for critical fluid management. At this point, she was transferred to the front of the department as it was obvious she was a much more critical patient than she initially appeared. She never had any urine production throughout her ED stay. Dr. Dennis Armenta was emergently consulted and he stated he would be in to take the patient to the operating room. Dr. Rosa Duran was also consulted and agreed with the hyperkalemia treatment but also asked that the patient have her labwork rechecked 2 hours after the hyperkalemia treatment was done with redraw of her BMP. I spoke with Dr. Nj Santana who was covering for Dr. Rosa Elena Ruggiero and he was agreeable to admit the patient to the ICU after she went to the OR. I also discussed the case with Dr. Vanessa Tucker who was Dr. Jessica Diego resident. Patient had a second ultrasound-guided IV placed by physician after transfer to the front as she will require multiple reliable access sites in the ICU. The family was updated multiple times as to the patient's condition, disposition, and plan. They are agreeable to all measures being taken thus far and all planned measures up to this point. All questions were answered. ED Course as of Jul 07 2254   Sat Jul 07, 2018 2115 EKG Interpretation    Interpreted by emergency department physician    Rhythm: normal sinus   Rate: normal  Axis: normal  Ectopy: none  Conduction: normal  ST Segments: no acute change  T Waves: no acute change  Q Waves: none    Clinical Impression: no acute changes    Shefali Dick    [AD]      ED Course User Index  [AD] Shefali Dick, DO       --------------------------------------------- PAST HISTORY ---------------------------------------------  Past Medical History:  has a past medical history of Acute renal failure (ARF) ([facility] Diabetes mellitus ([facility] Fibroid, uterine; Hypertension; Kidney stone; and Thyroid disease. Past Surgical History:  has a past surgical history that includes Dilation and curettage of uterus;  Lithotripsy;

## 2018-07-08 NOTE — ANESTHESIA PRE PROCEDURE
Department of Anesthesiology  Preprocedure Note       Name:  Xiomara Aguila   Age:  50 y.o.  :  1965                                          MRN:  [id number]         Date:  2018      Surgeon:  Holger Carl    Procedure: Procedure(s):  CYSTOSCOPY RETROGRADE PYELOGRAM STENT INSERTION    Medications prior to admission:   Prior to Admission medications    Medication Sig Start Date End Date Taking? Authorizing Provider   tamsulosin (FLOMAX) 0.4 MG capsule Take 1 capsule by mouth daily 18  Yes MORALES Noonan   metFORMIN (GLUCOPHAGE) 1000 MG tablet Take 1,000 mg by mouth 2 times daily (with meals)   Yes Historical Provider, MD   insulin lispro (HUMALOG) 100 UNIT/ML injection vial Inject 0-3 Units into the skin nightly 10/28/15  Yes Kelly Watts MD   insulin lispro (HUMALOG) 100 UNIT/ML injection vial Inject 0-6 Units into the skin 3 times daily (with meals) 10/28/15  Yes Kelly Watts MD   levothyroxine (SYNTHROID) 50 MCG tablet Take 50 mcg by mouth Daily. Yes Historical Provider, MD   ketorolac (TORADOL) 10 MG tablet Take 1 tablet by mouth every 6 hours as needed for Pain 18   MORALES Noonan   lidocaine (XYLOCAINE) 2 % jelly Apply topically to affected area three times a day as needed for pain.  17   Yulissa Ascencio PA-C   cyclobenzaprine (FLEXERIL) 10 MG tablet Take 1 tablet by mouth 3 times daily as needed for Muscle spasms (May cause drowsiness) 17   Balbir Kramer PA-C   insulin glargine (LANTUS) 100 UNIT/ML injection vial Inject 10 Units into the skin nightly 10/28/15   Kelly Watts MD       Current medications:    Current Facility-Administered Medications   Medication Dose Route Frequency Provider Last Rate Last Dose    sodium chloride flush 0.9 % injection 10 mL  10 mL Intravenous PRN Thee Nagels DO        0.9 % sodium chloride bolus  30 mL/kg Intravenous Once Thee Lewis DO 2,979 mL/hr at 18 2233 2,979 mL at 18 2233    sodium bicarbonate 150 mEq in dextrose 5 % 1,000 mL infusion   Intravenous Continuous Chava Romanian,  mL/hr at 07/07/18 2080      sodium polystyrene (KAYEXALATE) 15 GM/60ML suspension              Current Outpatient Prescriptions   Medication Sig Dispense Refill    tamsulosin (FLOMAX) 0.4 MG capsule Take 1 capsule by mouth daily 30 capsule 0    metFORMIN (GLUCOPHAGE) 1000 MG tablet Take 1,000 mg by mouth 2 times daily (with meals)      insulin lispro (HUMALOG) 100 UNIT/ML injection vial Inject 0-3 Units into the skin nightly 1 vial 3    insulin lispro (HUMALOG) 100 UNIT/ML injection vial Inject 0-6 Units into the skin 3 times daily (with meals) 1 vial 3    levothyroxine (SYNTHROID) 50 MCG tablet Take 50 mcg by mouth Daily.  ketorolac (TORADOL) 10 MG tablet Take 1 tablet by mouth every 6 hours as needed for Pain 20 tablet 0    lidocaine (XYLOCAINE) 2 % jelly Apply topically to affected area three times a day as needed for pain.  30 mL 0    cyclobenzaprine (FLEXERIL) 10 MG tablet Take 1 tablet by mouth 3 times daily as needed for Muscle spasms (May cause drowsiness) 15 tablet 0    insulin glargine (LANTUS) 100 UNIT/ML injection vial Inject 10 Units into the skin nightly 1 vial 3       Allergies:  No Known Allergies    Problem List:    Patient Active Problem List   Diagnosis Code    UTI (urinary tract infection) N39.0    Acute renal failure (ARF) ([address] N17.9    Hyperglycemia R73.9    Hypertension I10    Leukocytosis D72.829       Past Medical History:        Diagnosis Date    Acute renal failure (ARF) ([address] 10/25/2015    Diabetes mellitus ([address]     Fibroid, uterine abnormal cells on cervix craming and clotting    Hypertension 10/25/2015    Kidney stone     Thyroid disease        Past Surgical History:        Procedure Laterality Date    DILATION AND CURETTAGE OF UTERUS      HYSTERECTOMY  Jun 2013    Robotic Assisted Total Hysterectomy w/BSO, Cystoscopy    LITHOTRIPSY      TONSILLECTOMY Applicable):  No results found for: LABABO, [address]    Anesthesia Evaluation  Patient summary reviewed no history of anesthetic complications:   Airway: Mallampati: II  TM distance: >3 FB   Neck ROM: full  Mouth opening: > = 3 FB Dental: normal exam         Pulmonary: breath sounds clear to auscultation      (-) COPD and asthma                          ROS comment: Former smoker   Cardiovascular:  Exercise tolerance: good (>4 METS),   (+) hypertension:,     (-) past MI and CAD    ECG reviewed  Rhythm: regular  Rate: normal                    Neuro/Psych:      (-) seizures, neuromuscular disease and CVA           GI/Hepatic/Renal:   (+) renal disease: kidney stones and ARF,      (-) PUD       Endo/Other:    (+) DiabetesType II DM, no interval change, , electrolyte abnormalities, . Abdominal:   (+) obese,         Vascular: negative vascular ROS. Anesthesia Plan      MAC     ASA 4       Induction: intravenous. MIPS: Postoperative opioids intended. Anesthetic plan and risks discussed with patient. Plan discussed with CRNA.                   Liv Chandler MD   7/7/2018

## 2018-07-08 NOTE — PROGRESS NOTES
0018 warmer applied for comfort. urine blood tinged scds applied bicarb drip infusing at 125/hr .  Alert and oriented taking ice chips rachael well

## 2018-07-08 NOTE — H&P
bilateral ureteral obstruction with azotemia. Her creatinine is 9.5 and her BUN is 102. Her potassium was 7.3 and this has been treated to medically.  Her white count is 14,600    Plan:    Urgent cystoscopy retrograde pyelogram and insertion of stents bilateral  Definitive surgery will be done at a later time endoscopically  Metabolic evaluation be done at a later time  She will go to the ICU postoperatively as a fluid management and possible urosepsis will be her immediate concerns    Electronically signed by Mikey Neumann MD on 7/7/2018 at 11:21 PM

## 2018-07-08 NOTE — OP NOTE
[address]                                 OPERATIVE REPORT    PATIENT NAME: Gus Mckay                   :        1965  MED REC NO:   [id number]                            ROOM:       02  ACCOUNT NO:   [de-identified]                           ADMIT DATE: 2018  PROVIDER:     Danielle West MD    DATE OF PROCEDURE:  2018    PREOPERATIVE DIAGNOSES:  Anuria, bilateral ureteral calculi creating  azotemia, possible pyelonephrosis, and hyperkalemia. POSTOPERATIVE DIAGNOSIS:  5-mm stones on each ureter on the left it was at  about L3 and on the right it was at about L5. ANESTHESIA:  Monitored sedation. CONDITION:  Stable. COMPLICATIONS:  None. DISPOSITION:  PACU, then ICU. SURGEON:  Danielle West MD    BLOOD LOSS:  None. OPERATIVE PROCEDURE:  The time-out was read by me, the Anesthesia, and the  operating staff; reviewed history, physical, allergy, and medications. A 1  gm of Ancef was given upon induction. The patient was placed in a  lithotomy position, prepped and draped in the usual fashion. No undue  tension at the hips, knees, or buttocks. A #21 panendoscope with obturator  inserted into the bladder. There were no stones, clots, or foreign bodies. No bladder urine obtained. There was none as she is anuric. The bladder  showed no trabeculation, cellule, or diverticular formation. No cystitis  cystica, enterovesical fistula, or extrinsic imprint. Bladder capacity was  over 300 mL, which was normal.  The trigone was well developed. Both  ureteral orifices appeared to be singular and non-refluxing.  film of  the abdomen did demonstrate possible calcification at about L3 on the left  and I saw none on the right.   A 5 open-ended catheter was inserted on the  left first and up to the stone and then the Glidewire was used to fish  beyond the stone into the renal

## 2018-07-08 NOTE — PROGRESS NOTES
7/8/2018 11:04 AM  Service: Urology  Group: [facility] (S[facility]hartha/Jake)    Eryn Jeffrey  [id number]    Subjective:  No dyspnea today  She is at bedrest  No flank pain  She's tolerating the Molina well  She's afebrile    Review of Systems  Respiratory: negative  Cardiovascular: negative  Gastrointestinal: negative  Hematologic/lymphatic: negative  Musculoskeletal:negative  Neurological: negative  Endocrine: negative    Scheduled Meds:   levothyroxine  50 mcg Oral Daily    sodium chloride flush  10 mL Intravenous 2 times per day    insulin lispro  3 Units Subcutaneous TID WC    insulin lispro  0-6 Units Subcutaneous TID WC    insulin lispro  0-3 Units Subcutaneous Nightly    insulin glargine  10 Units Subcutaneous QAM AC    [START ON 7/9/2018] cefepime  250 mg Intravenous Q24H    [START ON 7/9/2018] ceFAZolin (ANCEF) IVPB  500 mg Intravenous Q24H       Objective:  Vitals:    07/08/18 0905   BP: 136/64   Pulse: 105   Resp: 20   Temp:    SpO2: 95%         Allergies: Patient has no known allergies. General Appearance: alert and oriented to person, place and time and in no acute distress  Skin: no rash or erythema  Head: normocephalic and atraumatic  Pulmonary/Chest: clear to auscultation bilaterally- no wheezes, rales or rhonchi, normal air movement, no respiratory distress and no chest wall tenderness  Abdomen: soft, non-tender, non-distended, normal bowel sounds, no masses or organomegaly and no inguinal adenopathy  Genitourinary: genitals normal without hernia or inguinal adenopathy  Extremities: no cyanosis, clubbing or edema and Jung's sign negative bilaterally  Musculoskeletal: no swollen joints and no trigger point or muscular tenderness      Molina well positioned and draining clear urine.     Labs:     Recent Labs      07/08/18   0512   NA  140   K  4.8   CL  93*   CO2  16*   BUN  85*   CREATININE  6.7*   GLUCOSE  185*   CALCIUM  7.4*       Lab Results   Component Value Date    HGB 8.8 07/08/2018

## 2018-07-08 NOTE — H&P
10.8 (L) 07/07/2018    HCT 31.2 (L) 07/07/2018     07/07/2018     (L) 07/07/2018    K 7.3 (HH) 07/07/2018    CL 79 (L) 07/07/2018    CREATININE 9.5 (HH) 07/07/2018     (H) 07/07/2018    CO2 8 (LL) 07/07/2018    GLUCOSE 162 (H) 07/07/2018    ALT 17 09/01/2016    AST 18 09/01/2016     No results found for: CKTOTAL, CKMB, CKMBINDEX, TROPONINI  No results for input(s): BNP in the last 72 hours. Radiology-  Ct Abdomen Pelvis Wo Contrast    Result Date: 7/7/2018  Location:200 Exam: CT ABDOMEN PELVIS WO CONTRAST Comparison: None History:  Right flank pain and vomiting FINDINGS: Noncontrast spiral CT the abdomen with coronal and sagittal reconstructions. Automated dose exposure control was utilized for this examination. Visualized lung bases are clear. There is bilateral moderate hydronephrosis. There are bilateral nonobstructing renal calculi. There is a calculus in the right ureter measuring 5.3 x 5.3 cm in diameter, with a Hounsfield density of 340. The calculus is located at the inferior endplate of L3. There are 2 calculi in the proximal ureter on the left, located at the L3 level one measuring 5 x 5 mm, the other measuring 8.5 x 5.5 mm. The other abdominal and retroperitoneal organs on this limited noncontrast study are unremarkable. There is no evidence of adenopathy or ascites. The visualized intestinal structures are normal. The appendix is normal. The abdominal aorta is normal in size. The pelvic organs are within normal limits. No evidence of pelvic mass adenopathy or free air. 1. Bilateral obstructing ureteral calculi, with moderate hydronephrosis, as described above.       EKG-   Sinus tach     Therapy in ED-   Medications   sodium chloride flush 0.9 % injection 10 mL (not administered)   cefepime (MAXIPIME) 2 g IVPB extended (mini-bag) (2 g Intravenous New Bag 7/7/18 9899)   0.9 % sodium chloride bolus (2,979 mLs Intravenous New Bag 7/7/18 4793)   sodium bicarbonate 8.4 % injection 50 mEq (50 mEq Intravenous Given 7/7/18 2237)   fentaNYL (SUBLIMAZE) injection 50 mcg (not administered)   sodium polystyrene (KAYEXALATE) 15 GM/60ML suspension 30 g (not administered)   sodium bicarbonate 150 mEq in dextrose 5 % 1,000 mL infusion (not administered)   ondansetron (ZOFRAN) injection 4 mg (4 mg Intravenous Given 7/7/18 2117)   albuterol (PROVENTIL) nebulizer solution 10 mg (10 mg Nebulization Given 7/7/18 2222)   insulin regular (HUMULIN R;NOVOLIN R) injection 10 Units (10 Units Intravenous Given 7/7/18 2232)   dextrose 50 % solution 50 mL (50 mLs Intravenous Given 7/7/18 2234)         Past Medical History:   Diagnosis Date    Acute renal failure (ARF) ([facility] 10/25/2015    Diabetes mellitus ([facility]     Fibroid, uterine abnormal cells on cervix craming and clotting    Hypertension 10/25/2015    Kidney stone     Thyroid disease        Past Surgical History:   Procedure Laterality Date    DILATION AND CURETTAGE OF UTERUS      HYSTERECTOMY  Jun 2013    Robotic Assisted Total Hysterectomy w/BSO, Cystoscopy    LITHOTRIPSY      TONSILLECTOMY         Family History  History reviewed. No pertinent family history. Social History  Patient lives at home . Employment: no  Illicit drug use- no  TOBACCO:   reports that she has quit smoking. She quit after 0.50 years of use. She has never used smokeless tobacco.  ETOH:   reports that she does not drink alcohol. Home Medications  No current facility-administered medications on file prior to encounter.       Current Outpatient Prescriptions on File Prior to Encounter   Medication Sig Dispense Refill    tamsulosin (FLOMAX) 0.4 MG capsule Take 1 capsule by mouth daily 30 capsule 0    metFORMIN (GLUCOPHAGE) 1000 MG tablet Take 1,000 mg by mouth 2 times daily (with meals)      insulin lispro (HUMALOG) 100 UNIT/ML injection vial Inject 0-3 Units into the skin nightly 1 vial 3    insulin lispro (HUMALOG) 100 UNIT/ML injection vial Inject 0-6 Units into the skin 3 times daily (with meals) 1 vial 3    levothyroxine (SYNTHROID) 50 MCG tablet Take 50 mcg by mouth Daily.  ketorolac (TORADOL) 10 MG tablet Take 1 tablet by mouth every 6 hours as needed for Pain 20 tablet 0    lidocaine (XYLOCAINE) 2 % jelly Apply topically to affected area three times a day as needed for pain. 30 mL 0    cyclobenzaprine (FLEXERIL) 10 MG tablet Take 1 tablet by mouth 3 times daily as needed for Muscle spasms (May cause drowsiness) 15 tablet 0    insulin glargine (LANTUS) 100 UNIT/ML injection vial Inject 10 Units into the skin nightly 1 vial 3       Allergies  No Known Allergies    Review of Systems  Please see HPI above. All bolded are positive. All un-bolded are negative. Gen: fever, chills, fatigue, weakness, diaphoresis, increase in thirst, unintentional weight change, loss of appetite  Head: headache, vision change, hearing loss  Chest: chest pain, chest heaviness, palpitations  Lungs: shortness of breath, wheezing, coughing  Abdomen: abdominal pain, nausea, vomiting, diarrhea, constipation, melena, hematochezia, hematemesis  Extremities: lower extremity edema, myalgias, arthralgias  Urinary: dysuria, hematuria, or increase in frequency  Neurologic: lightheadedness, dizziness, confusion, syncope  Psychiatric: depression, suicidal ideation, or anxiety    Objective  Vitals:    07/07/18 2041   BP: (!) 157/72   Pulse: 100   Resp: 18   Temp: 98.5 °F (36.9 °C)   SpO2: 98%       Physical Exam:  General: Awake, alert, oriented to person, place, time, and purpose, appears stated age, cooperative, no acute distress, pleasant   Head: Normocephalic, atraumatic  Eyes: Conjunctivae/corneas clear, Sclera non icteric  Mouth: Mucous membranes dry  Neck: No JVD, no adenopathy, no carotid bruit, neck is supple, trachea is midline  Back: ROM normal, No CVA tenderness. Lungs: Clear to auscultation bilaterally. No retractions or use of accessory muscles. No vocal fremitus.  No rhonchi, crackle or rale. Heart: Regular rate and regular rhythm, no murmur, normal S1, S2  Abdomen: Soft, non-tender; bowel sounds normal; no masses, no organomegaly  Extremities:No lower extremity edema, extremities atraumatic, no cyanosis, no clubbing, 2+ pedal pulses palpated  Skin: Normal color, normal texture, normal turgor, no rashes, no lesions  Neurologic: Cranial nerves II through XII intact    Mircobiology  Urine and Blood  cultures have been sent. Assessment  Sepsis secondary to bilateral 15 ureter calculi  Acute renal failure secondary to above  Hyperkalemia  Metabolic acidosis  Insulin-dependent diabetes mellitus          Plan  Urology was consult from the emergency room. Patient is to the OR for removal of the bilateral obstructing ureteral calculi. Continue the patient aggressive IV fluid hydration overnight with bicarbonate infusion. Repeat BMP this evening. We'll treat IV insulin and albuterol continues to be elevated. Feel improvement kidney function in the morning. Consult to nephrology. · Continue to manage other medical conditions listed above as previously managed. · Routine labs in am  · DVT prophylaxis. · Please see orders for further management and care. · This patient was discussed with Dr. Ira Powell. Elle Ramachandran DO, PGY3  7/7/2018  10:39 PM    NOTE: This report was transcribed using voice recognition software.  Every effort was made to ensure accuracy; however, inadvertent computerized transcription errors may be present

## 2018-07-08 NOTE — PROGRESS NOTES
peripheral edema. No ulcers. Neurologic:  Alert x 3. No focal deficit. Cranial nerves grossly intact. Skin:  No petechia. No hemorrhage. No wounds. CBC with Differential:    Lab Results   Component Value Date    WBC 12.5 07/08/2018    RBC 3.09 07/08/2018    HGB 8.8 07/08/2018    HCT 26.1 07/08/2018     07/08/2018    MCV 84.5 07/08/2018    MCH 28.5 07/08/2018    MCHC 33.7 07/08/2018    RDW 13.0 07/08/2018    SEGSPCT 59 06/05/2013    BANDSPCT 1 10/23/2015    METASPCT 0.5 07/08/2018    LYMPHOPCT 12.6 07/08/2018    MONOPCT 3.5 07/08/2018    BASOPCT 0.1 07/08/2018    MONOSABS 0.50 07/08/2018    LYMPHSABS 1.63 07/08/2018    EOSABS 0.00 07/08/2018    BASOSABS 0.00 07/08/2018     CMP:    Lab Results   Component Value Date     07/08/2018    K 4.8 07/08/2018    K 7.3 07/07/2018    CL 93 07/08/2018    CO2 16 07/08/2018    BUN 85 07/08/2018    CREATININE 6.7 07/08/2018    GFRAA 8 07/08/2018    LABGLOM 7 07/08/2018    GLUCOSE 185 07/08/2018    PROT 5.8 07/08/2018    LABALBU 3.0 07/08/2018    CALCIUM 7.4 07/08/2018    BILITOT 0.2 07/08/2018    ALKPHOS 56 07/08/2018    AST 13 07/08/2018    ALT 12 07/08/2018       Other Data:      Intake/Output Summary (Last 24 hours) at 07/08/18 0714  Last data filed at 07/08/18 4517   Gross per 24 hour   Intake          2301.35 ml   Output             2080 ml   Net           221.35 ml         Scheduled Medications:   levothyroxine  50 mcg Oral Daily    sodium chloride flush  10 mL Intravenous 2 times per day    insulin lispro  3 Units Subcutaneous TID WC    insulin lispro  0-6 Units Subcutaneous TID WC    insulin lispro  0-3 Units Subcutaneous Nightly    insulin glargine  10 Units Subcutaneous QAM AC    [START ON 7/9/2018] cefepime  250 mg Intravenous Q24H    [START ON 7/9/2018] ceFAZolin (ANCEF) IVPB  500 mg Intravenous Q24H         Infusion Medications:   dextrose      sodium chloride         Assessment:   1.  Sepsis secondary to bilateral obstructive uropathy

## 2018-07-08 NOTE — BRIEF OP NOTE
Brief Postoperative Note    Delmy Osborne  YOB: 1965  [id number]    Pre-operative Diagnosis: Bilateral obstructing ureteral calculi with profound azotemia and hyperkalemia with anuria    Post-operative Diagnosis: Same 5mm stone L3 left and 5mm stone L5 right  Procedure: Cystopanendoscopy retrograde pyelogram bilateral stent insertion    Anesthesia: MAC    Surgeons/Assistants: Shay Carl      Estimated Blood Loss: less than 50     Complications: None    Specimens: Was Obtained: Culture from each renal pelvis    Findings: As above    Electronically signed by Shashank Carr MD on 7/7/2018 at 11:31 PM

## 2018-07-08 NOTE — CONSULTS
A comprehensive review of systems was negative. She does not take NSAIDs. She has not been treated with recent course of antimicrobials. Physical exam:   Vital signs reviewed  Pleasant age-appropriate obese white woman in no apparent distress. HEENT: NC/AT EOMI PERRLA sclera and conjunctiva are clear and and icteric mucous membranes are moist   neck is soft supple no lymphadenopathy no thyromegaly trachea midline no carotid bruit no JVD  CTA bilaterally  Regular rhythm normal S1-S2 no murmur  Abdomen soft nontender nondistended normoactive bowel sounds  Distal tremors without edema  Pulses 3+ ×4  Awake alert appropriate moves all 4 extremities.   Cranial nerves II-12 grossly intact    Data:   Labs:  CBC with Differential:  Lab Results   Component Value Date    WBC 12.5 07/08/2018    RBC 3.09 07/08/2018    HGB 8.8 07/08/2018    HCT 26.1 07/08/2018     07/08/2018    MCV 84.5 07/08/2018    MCH 28.5 07/08/2018    MCHC 33.7 07/08/2018    RDW 13.0 07/08/2018    SEGSPCT 59 06/05/2013    BANDSPCT 1 10/23/2015    METASPCT 0.5 07/08/2018    LYMPHOPCT 12.6 07/08/2018    MONOPCT 3.5 07/08/2018    BASOPCT 0.1 07/08/2018    MONOSABS 0.50 07/08/2018    LYMPHSABS 1.63 07/08/2018    EOSABS 0.00 07/08/2018    BASOSABS 0.00 07/08/2018     CMP:  Lab Results   Component Value Date     07/08/2018    K 4.8 07/08/2018    K 7.3 07/07/2018    CL 93 07/08/2018    CO2 16 07/08/2018    BUN 85 07/08/2018    CREATININE 6.7 07/08/2018    GFRAA 8 07/08/2018    LABGLOM 7 07/08/2018    GLUCOSE 185 07/08/2018    PROT 5.8 07/08/2018    LABALBU 3.0 07/08/2018    CALCIUM 7.4 07/08/2018    BILITOT 0.2 07/08/2018    ALKPHOS 56 07/08/2018    AST 13 07/08/2018    ALT 12 07/08/2018     Ionized Calcium:  No results found for: IONCA  Magnesium:    Lab Results   Component Value Date    MG 1.8 07/08/2018     Phosphorus:    Lab Results   Component Value Date    PHOS 7.1 07/08/2018     U/A:  Lab Results   Component Value Date    COLORU RED [address]      Thank you for the opportunity to participate in the care of your pleasant patient. We look forward to following along with you.       Electronically signed by Thomas Kinney MD on 7/8/2018 at 7:43 AM

## 2018-07-08 NOTE — ED NOTES
Attempted to straight cath pt, unable to obtain urine. Pt states she urinated at some point today but has had trouble urinating. Denies pain with urination.      Karoline Guerra RN  07/07/18 [id number]

## 2018-07-09 LAB
ALBUMIN SERPL-MCNC: 2.9 G/DL (ref 3.5–5.2)
ALP BLD-CCNC: 54 U/L (ref 35–104)
ALT SERPL-CCNC: 10 U/L (ref 0–32)
ANION GAP SERPL CALCULATED.3IONS-SCNC: 11 MMOL/L (ref 7–16)
AST SERPL-CCNC: 13 U/L (ref 0–31)
BASOPHILS ABSOLUTE: 0.03 E9/L (ref 0–0.2)
BASOPHILS RELATIVE PERCENT: 0.4 % (ref 0–2)
BILIRUB SERPL-MCNC: 0.5 MG/DL (ref 0–1.2)
BUN BLDV-MCNC: 35 MG/DL (ref 6–20)
CALCIUM SERPL-MCNC: 7.2 MG/DL (ref 8.6–10.2)
CHLORIDE BLD-SCNC: 101 MMOL/L (ref 98–107)
CO2: 29 MMOL/L (ref 22–29)
CREAT SERPL-MCNC: 1.9 MG/DL (ref 0.5–1)
EOSINOPHILS ABSOLUTE: 0.08 E9/L (ref 0.05–0.5)
EOSINOPHILS RELATIVE PERCENT: 1.2 % (ref 0–6)
GFR AFRICAN AMERICAN: 34
GFR NON-AFRICAN AMERICAN: 28 ML/MIN/1.73
GLUCOSE BLD-MCNC: 150 MG/DL (ref 74–109)
HCT VFR BLD CALC: 24.6 % (ref 34–48)
HEMOGLOBIN: 8 G/DL (ref 11.5–15.5)
IMMATURE GRANULOCYTES #: 0.02 E9/L
IMMATURE GRANULOCYTES %: 0.3 % (ref 0–5)
LYMPHOCYTES ABSOLUTE: 1.43 E9/L (ref 1.5–4)
LYMPHOCYTES RELATIVE PERCENT: 20.9 % (ref 20–42)
MAGNESIUM: 1.6 MG/DL (ref 1.6–2.6)
MCH RBC QN AUTO: 28.1 PG (ref 26–35)
MCHC RBC AUTO-ENTMCNC: 32.5 % (ref 32–34.5)
MCV RBC AUTO: 86.3 FL (ref 80–99.9)
METER GLUCOSE: 150 MG/DL (ref 70–110)
METER GLUCOSE: 151 MG/DL (ref 70–110)
METER GLUCOSE: 154 MG/DL (ref 70–110)
METER GLUCOSE: 163 MG/DL (ref 70–110)
METER GLUCOSE: 172 MG/DL (ref 70–110)
MONOCYTES ABSOLUTE: 0.79 E9/L (ref 0.1–0.95)
MONOCYTES RELATIVE PERCENT: 11.6 % (ref 2–12)
MRSA CULTURE ONLY: NORMAL
NEUTROPHILS ABSOLUTE: 4.48 E9/L (ref 1.8–7.3)
NEUTROPHILS RELATIVE PERCENT: 65.6 % (ref 43–80)
PDW BLD-RTO: 13.3 FL (ref 11.5–15)
PHOSPHORUS: 3 MG/DL (ref 2.5–4.5)
PLATELET # BLD: 215 E9/L (ref 130–450)
PMV BLD AUTO: 9.5 FL (ref 7–12)
POTASSIUM SERPL-SCNC: 4 MMOL/L (ref 3.5–5)
RBC # BLD: 2.85 E12/L (ref 3.5–5.5)
SODIUM BLD-SCNC: 141 MMOL/L (ref 132–146)
TOTAL PROTEIN: 5.7 G/DL (ref 6.4–8.3)
WBC # BLD: 6.8 E9/L (ref 4.5–11.5)

## 2018-07-09 PROCEDURE — 80053 COMPREHEN METABOLIC PANEL: CPT

## 2018-07-09 PROCEDURE — 84100 ASSAY OF PHOSPHORUS: CPT

## 2018-07-09 PROCEDURE — 2580000003 HC RX 258: Performed by: INTERNAL MEDICINE

## 2018-07-09 PROCEDURE — 36415 COLL VENOUS BLD VENIPUNCTURE: CPT

## 2018-07-09 PROCEDURE — 2580000003 HC RX 258: Performed by: UROLOGY

## 2018-07-09 PROCEDURE — 1200000000 HC SEMI PRIVATE

## 2018-07-09 PROCEDURE — 6370000000 HC RX 637 (ALT 250 FOR IP): Performed by: INTERNAL MEDICINE

## 2018-07-09 PROCEDURE — 6360000002 HC RX W HCPCS: Performed by: INTERNAL MEDICINE

## 2018-07-09 PROCEDURE — 82962 GLUCOSE BLOOD TEST: CPT

## 2018-07-09 PROCEDURE — 85025 COMPLETE CBC W/AUTO DIFF WBC: CPT

## 2018-07-09 PROCEDURE — 83735 ASSAY OF MAGNESIUM: CPT

## 2018-07-09 RX ORDER — MAGNESIUM HYDROXIDE/ALUMINUM HYDROXICE/SIMETHICONE 120; 1200; 1200 MG/30ML; MG/30ML; MG/30ML
30 SUSPENSION ORAL EVERY 6 HOURS PRN
Status: DISCONTINUED | OUTPATIENT
Start: 2018-07-09 | End: 2018-07-10 | Stop reason: HOSPADM

## 2018-07-09 RX ORDER — SODIUM CHLORIDE, SODIUM LACTATE, POTASSIUM CHLORIDE, CALCIUM CHLORIDE 600; 310; 30; 20 MG/100ML; MG/100ML; MG/100ML; MG/100ML
INJECTION, SOLUTION INTRAVENOUS CONTINUOUS
Status: DISCONTINUED | OUTPATIENT
Start: 2018-07-09 | End: 2018-07-10 | Stop reason: HOSPADM

## 2018-07-09 RX ADMIN — INSULIN GLARGINE 10 UNITS: 100 INJECTION, SOLUTION SUBCUTANEOUS at 09:00

## 2018-07-09 RX ADMIN — ALUMINUM HYDROXIDE, MAGNESIUM HYDROXIDE, AND SIMETHICONE 30 ML: 200; 200; 20 SUSPENSION ORAL at 20:21

## 2018-07-09 RX ADMIN — SODIUM CHLORIDE: 4.5 INJECTION, SOLUTION INTRAVENOUS at 03:49

## 2018-07-09 RX ADMIN — SODIUM CHLORIDE, POTASSIUM CHLORIDE, SODIUM LACTATE AND CALCIUM CHLORIDE: 600; 310; 30; 20 INJECTION, SOLUTION INTRAVENOUS at 19:17

## 2018-07-09 RX ADMIN — INSULIN LISPRO 3 UNITS: 100 INJECTION, SOLUTION INTRAVENOUS; SUBCUTANEOUS at 12:47

## 2018-07-09 RX ADMIN — Medication 10 ML: at 08:27

## 2018-07-09 RX ADMIN — INSULIN LISPRO 3 UNITS: 100 INJECTION, SOLUTION INTRAVENOUS; SUBCUTANEOUS at 17:41

## 2018-07-09 RX ADMIN — INSULIN LISPRO 1 UNITS: 100 INJECTION, SOLUTION INTRAVENOUS; SUBCUTANEOUS at 09:02

## 2018-07-09 RX ADMIN — INSULIN LISPRO 1 UNITS: 100 INJECTION, SOLUTION INTRAVENOUS; SUBCUTANEOUS at 12:46

## 2018-07-09 RX ADMIN — INSULIN LISPRO 3 UNITS: 100 INJECTION, SOLUTION INTRAVENOUS; SUBCUTANEOUS at 09:01

## 2018-07-09 RX ADMIN — CEFAZOLIN SODIUM 500 MG: 500 INJECTION, POWDER, FOR SOLUTION INTRAMUSCULAR; INTRAVENOUS at 03:44

## 2018-07-09 RX ADMIN — LEVOTHYROXINE SODIUM 50 MCG: 50 TABLET ORAL at 06:44

## 2018-07-09 RX ADMIN — INSULIN LISPRO 1 UNITS: 100 INJECTION, SOLUTION INTRAVENOUS; SUBCUTANEOUS at 21:08

## 2018-07-09 RX ADMIN — INSULIN LISPRO 1 UNITS: 100 INJECTION, SOLUTION INTRAVENOUS; SUBCUTANEOUS at 17:40

## 2018-07-09 ASSESSMENT — PAIN SCALES - GENERAL
PAINLEVEL_OUTOF10: 0

## 2018-07-09 NOTE — PROGRESS NOTES
[facility], [address] [facility] [place], [place], [address] [place] [address] [address]         Phone: [phone]  Fax: [phone] ROSALIE Currie., [address], [place], [address]   Phone: [phone]  Fax: [phone]    Audie Osgood A. Roddie Sieve, M.D. Ellan Leavens, M.D. Verdene Givens, M.D.                   7/9/2018   [facility] 1965 7/9 pt seen feels good   Cr better and k better .      Physical Exam:   VITALS:  BP (!) 147/77   Pulse 77   Temp 98.8 °F (37.1 °C) (Oral)   Resp 15   Ht 5' 2\" (1.575 m)   Wt 227 lb 14.4 oz (103.4 kg)   LMP 05/12/2013   SpO2 100%   BMI 41.68 kg/m²   24HR INTAKE/OUTPUT:    Intake/Output Summary (Last 24 hours) at 07/09/18 1019  Last data filed at 07/09/18 0600   Gross per 24 hour   Intake           2565.5 ml   Output             3600 ml   Net          -1034.5 ml     Alert resp not labored   rrr course bs   Soft nt no edmea    DATA:    CBC: Lab Results   Component Value Date    WBC 6.8 07/09/2018    RBC 2.85 07/09/2018    HGB 8.0 07/09/2018    HCT 24.6 07/09/2018    MCV 86.3 07/09/2018    MCH 28.1 07/09/2018    MCHC 32.5 07/09/2018    RDW 13.3 07/09/2018     07/09/2018    MPV 9.5 07/09/2018     CMP:  Lab Results   Component Value Date     07/09/2018    K 4.0 07/09/2018    K 7.3 07/07/2018     07/09/2018    CO2 29 07/09/2018    BUN 35 07/09/2018    CREATININE 1.9 07/09/2018    GFRAA 34 07/09/2018    LABGLOM 28 07/09/2018    GLUCOSE 150 07/09/2018    PROT 5.7 07/09/2018    CALCIUM 7.2 07/09/2018    BILITOT 0.5 07/09/2018    ALKPHOS 54 07/09/2018    AST 13 07/09/2018    ALT 10 07/09/2018     BMP:  Lab Results   Component Value Date     07/09/2018    K 4.0 07/09/2018    K 7.3 07/07/2018     07/09/2018    CO2 29 07/09/2018 BUN 35 07/09/2018    CREATININE 1.9 07/09/2018    CALCIUM 7.2 07/09/2018    GFRAA 34 07/09/2018    LABGLOM 28 07/09/2018    GLUCOSE 150 07/09/2018     Ionized Calcium:  No results found for: IONCA  Magnesium:    Lab Results   Component Value Date    MG 1.6 07/09/2018     Phosphorus:    Lab Results   Component Value Date    PHOS 3.0 07/09/2018     U/A:  Lab Results   Component Value Date    COLORU RED 07/08/2018    PHUR 5.0 07/08/2018    LABCAST RARE 10/23/2015    WBCUA 5-10 07/08/2018    RBCUA >20 07/08/2018    BACTERIA RARE 07/08/2018    CLARITYU CLOUDY 07/08/2018    SPECGRAV 1.025 07/08/2018    LEUKOCYTESUR SMALL 07/08/2018    UROBILINOGEN 0.2 07/08/2018    BILIRUBINUR Negative 07/08/2018    BLOODU LARGE 07/08/2018    GLUCOSEU Negative 07/08/2018     Urine Culture:  No components found for: MALCOM  Blood Culture:  No components found for: Yuan Fletcher  Blood Culture from [address] [place] [address]:  No components found for: [facility] Problems:  Patient Active Problem List   Diagnosis    UTI (urinary tract infection)    Acute renal failure (ARF) ([address]    Hyperglycemia    Hypertension    Leukocytosis       Plan:  Assessment  1. Acute kidney injury secondary to bilateral ureteral obstruction with stones. Status post cystoscopy with retrograde pyelograms, placement of ureteral stents per Dr. Nena Kulkarni last night. Anuric prior to the procedure, greater than 2000 mL UO post procedure. BUN and creatinine improving briskly. 2. Metabolic acidosis, wide anion gap, secondary to acute kidney injury. Status post relief of obstruction, and bicarbonate drip, improving  3. Nephrolithiasis, bilateral ureteral obstruction with 5 mm stones. This is her fourth episode. Last one was 6 years ago. Has undergone lithotripsy ×2 in the past.  She tells me she has both calcium and uric acid stones.     When Stone episode is over consider 24-hour urine assess citrate levels and  Consider Urocit-K if necessary or treatment of uric

## 2018-07-09 NOTE — PROGRESS NOTES
[facility] PROGRESS NOTE                                                                       7/9/2018          SUBJECTIVE:    Afebrile  Creatinine 1.9 today  Having brisk diuresis  Pt feels well    OBJECTIVE:    BP (!) 141/86   Pulse 71   Temp 98.5 °F (36.9 °C) (Oral)   Resp 16   Ht 5' 2\" (1.575 m)   Wt 227 lb 14.4 oz (103.4 kg)   LMP 05/12/2013   SpO2 99%   BMI 41.68 kg/m²     PHYSICAL EXAMINATION:  Skin: dry, without rashes  Respirations: non-labored, intact  Abdomen: soft, non-tender, non-distended  Molina in place urine clear      Lab Results   Component Value Date    WBC 6.8 07/09/2018    HGB 8.0 (L) 07/09/2018    HCT 24.6 (L) 07/09/2018    MCV 86.3 07/09/2018     07/09/2018       Lab Results   Component Value Date    CREATININE 1.9 (H) 07/09/2018       No results found for: PSA    REVIEW OF SYSTEMS:    CONSTITUTIONAL: negative  HEENT: negative  HEMATOLOGIC: negative  ENDOCRINE: negative  RESPIRATORY: negative  CV: negative  GI: negative  NEURO: negative  ORTHOPEDICS: negative  PSYCHIATRIC: negative  : as above    PAST FAMILY HISTORY:  History reviewed. No pertinent family history.   PAST SOCIAL HISTORY:    Social History     Social History    Marital status:      Spouse name: N/A    Number of children: N/A    Years of education: N/A     Social History Main Topics    Smoking status: Former Smoker     Years: 0.50    Smokeless tobacco: Never Used      Comment: quit smoking 10 yrs ago    Alcohol use No    Drug use: No    Sexual activity: Not Asked     Other Topics Concern    None     Social History Narrative    None       Scheduled Meds:   levothyroxine  50 mcg Oral Daily    sodium chloride flush  10 mL Intravenous 2 times per day    insulin lispro  3 Units Subcutaneous TID WC    insulin lispro  0-6 Units Subcutaneous TID WC    insulin lispro  0-3 Units Subcutaneous Nightly    insulin glargine  10 Units Subcutaneous QAM AC    [START ON 7/10/2018] cefepime  250 mg Intravenous Q24H    ceFAZolin (ANCEF) IVPB  500 mg Intravenous Q24H     Continuous Infusions:   dextrose      sodium chloride 180 mL/hr at 07/09/18 0349     PRN Meds:.sodium chloride flush, ondansetron, glucose, dextrose, glucagon (rDNA), dextrose, HYDROcodone 5 mg - acetaminophen **OR** HYDROcodone 5 mg - acetaminophen, morphine, iopamidol, acetaminophen    ASSESSMENT:    Patient Active Problem List   Diagnosis    UTI (urinary tract infection)    Acute renal failure (ARF) (HCC)    Hyperglycemia    Hypertension    Leukocytosis       PLAN:  Increase diet and activity  Molina out   Home todmorrow?       Raine Huff M.D.  7/9/2018  7:29 AM

## 2018-07-10 VITALS
WEIGHT: 227.9 LBS | RESPIRATION RATE: 12 BRPM | OXYGEN SATURATION: 94 % | TEMPERATURE: 98.5 F | BODY MASS INDEX: 41.94 KG/M2 | HEART RATE: 78 BPM | HEIGHT: 62 IN | SYSTOLIC BLOOD PRESSURE: 152 MMHG | DIASTOLIC BLOOD PRESSURE: 70 MMHG

## 2018-07-10 LAB
ALBUMIN SERPL-MCNC: 2.9 G/DL (ref 3.5–5.2)
ALP BLD-CCNC: 58 U/L (ref 35–104)
ALT SERPL-CCNC: 11 U/L (ref 0–32)
ANION GAP SERPL CALCULATED.3IONS-SCNC: 12 MMOL/L (ref 7–16)
AST SERPL-CCNC: 16 U/L (ref 0–31)
BASOPHILS ABSOLUTE: 0.03 E9/L (ref 0–0.2)
BASOPHILS RELATIVE PERCENT: 0.4 % (ref 0–2)
BILIRUB SERPL-MCNC: 0.4 MG/DL (ref 0–1.2)
BUN BLDV-MCNC: 14 MG/DL (ref 6–20)
CALCIUM SERPL-MCNC: 7.9 MG/DL (ref 8.6–10.2)
CHLORIDE BLD-SCNC: 102 MMOL/L (ref 98–107)
CO2: 23 MMOL/L (ref 22–29)
CREAT SERPL-MCNC: 1 MG/DL (ref 0.5–1)
EKG ATRIAL RATE: 115 BPM
EKG ATRIAL RATE: 95 BPM
EKG P AXIS: 31 DEGREES
EKG P AXIS: 54 DEGREES
EKG P-R INTERVAL: 130 MS
EKG P-R INTERVAL: 148 MS
EKG Q-T INTERVAL: 320 MS
EKG Q-T INTERVAL: 346 MS
EKG QRS DURATION: 74 MS
EKG QRS DURATION: 76 MS
EKG QTC CALCULATION (BAZETT): 434 MS
EKG QTC CALCULATION (BAZETT): 442 MS
EKG R AXIS: -16 DEGREES
EKG R AXIS: 2 DEGREES
EKG T AXIS: -24 DEGREES
EKG T AXIS: 10 DEGREES
EKG VENTRICULAR RATE: 115 BPM
EKG VENTRICULAR RATE: 95 BPM
EOSINOPHILS ABSOLUTE: 0.22 E9/L (ref 0.05–0.5)
EOSINOPHILS RELATIVE PERCENT: 3.1 % (ref 0–6)
GFR AFRICAN AMERICAN: >60
GFR NON-AFRICAN AMERICAN: 58 ML/MIN/1.73
GLUCOSE BLD-MCNC: 154 MG/DL (ref 74–109)
HCT VFR BLD CALC: 26.9 % (ref 34–48)
HEMOGLOBIN: 8.6 G/DL (ref 11.5–15.5)
IMMATURE GRANULOCYTES #: 0.02 E9/L
IMMATURE GRANULOCYTES %: 0.3 % (ref 0–5)
LYMPHOCYTES ABSOLUTE: 1.55 E9/L (ref 1.5–4)
LYMPHOCYTES RELATIVE PERCENT: 21.8 % (ref 20–42)
MAGNESIUM: 1.4 MG/DL (ref 1.6–2.6)
MCH RBC QN AUTO: 28.2 PG (ref 26–35)
MCHC RBC AUTO-ENTMCNC: 32 % (ref 32–34.5)
MCV RBC AUTO: 88.2 FL (ref 80–99.9)
METER GLUCOSE: 136 MG/DL (ref 70–110)
METER GLUCOSE: 150 MG/DL (ref 70–110)
METER GLUCOSE: 165 MG/DL (ref 70–110)
MONOCYTES ABSOLUTE: 0.65 E9/L (ref 0.1–0.95)
MONOCYTES RELATIVE PERCENT: 9.2 % (ref 2–12)
NEUTROPHILS ABSOLUTE: 4.63 E9/L (ref 1.8–7.3)
NEUTROPHILS RELATIVE PERCENT: 65.2 % (ref 43–80)
PDW BLD-RTO: 13.1 FL (ref 11.5–15)
PHOSPHORUS: 1.7 MG/DL (ref 2.5–4.5)
PLATELET # BLD: 219 E9/L (ref 130–450)
PMV BLD AUTO: 9.2 FL (ref 7–12)
POTASSIUM SERPL-SCNC: 4.3 MMOL/L (ref 3.5–5)
RBC # BLD: 3.05 E12/L (ref 3.5–5.5)
SODIUM BLD-SCNC: 137 MMOL/L (ref 132–146)
TOTAL PROTEIN: 6.1 G/DL (ref 6.4–8.3)
URINE CULTURE, ROUTINE: NORMAL
URINE CULTURE, ROUTINE: NORMAL
WBC # BLD: 7.1 E9/L (ref 4.5–11.5)

## 2018-07-10 PROCEDURE — 6360000002 HC RX W HCPCS: Performed by: INTERNAL MEDICINE

## 2018-07-10 PROCEDURE — 84100 ASSAY OF PHOSPHORUS: CPT

## 2018-07-10 PROCEDURE — 2580000003 HC RX 258: Performed by: UROLOGY

## 2018-07-10 PROCEDURE — 6370000000 HC RX 637 (ALT 250 FOR IP): Performed by: INTERNAL MEDICINE

## 2018-07-10 PROCEDURE — 80053 COMPREHEN METABOLIC PANEL: CPT

## 2018-07-10 PROCEDURE — 36415 COLL VENOUS BLD VENIPUNCTURE: CPT

## 2018-07-10 PROCEDURE — 2580000003 HC RX 258: Performed by: INTERNAL MEDICINE

## 2018-07-10 PROCEDURE — 83735 ASSAY OF MAGNESIUM: CPT

## 2018-07-10 PROCEDURE — 85025 COMPLETE CBC W/AUTO DIFF WBC: CPT

## 2018-07-10 PROCEDURE — 82962 GLUCOSE BLOOD TEST: CPT

## 2018-07-10 RX ORDER — CIPROFLOXACIN 500 MG/1
500 TABLET, FILM COATED ORAL 2 TIMES DAILY
Qty: 20 TABLET | Refills: 0 | Status: SHIPPED | OUTPATIENT
Start: 2018-07-10 | End: 2018-07-20

## 2018-07-10 RX ADMIN — INSULIN LISPRO 3 UNITS: 100 INJECTION, SOLUTION INTRAVENOUS; SUBCUTANEOUS at 12:11

## 2018-07-10 RX ADMIN — INSULIN LISPRO 3 UNITS: 100 INJECTION, SOLUTION INTRAVENOUS; SUBCUTANEOUS at 16:40

## 2018-07-10 RX ADMIN — LEVOTHYROXINE SODIUM 50 MCG: 50 TABLET ORAL at 06:36

## 2018-07-10 RX ADMIN — CEFEPIME HYDROCHLORIDE 250 MG: 1 INJECTION, POWDER, FOR SOLUTION INTRAMUSCULAR; INTRAVENOUS at 03:50

## 2018-07-10 RX ADMIN — CEFAZOLIN SODIUM 500 MG: 500 INJECTION, POWDER, FOR SOLUTION INTRAMUSCULAR; INTRAVENOUS at 02:40

## 2018-07-10 RX ADMIN — INSULIN LISPRO 3 UNITS: 100 INJECTION, SOLUTION INTRAVENOUS; SUBCUTANEOUS at 08:48

## 2018-07-10 RX ADMIN — SODIUM CHLORIDE, POTASSIUM CHLORIDE, SODIUM LACTATE AND CALCIUM CHLORIDE: 600; 310; 30; 20 INJECTION, SOLUTION INTRAVENOUS at 06:36

## 2018-07-10 RX ADMIN — Medication 10 ML: at 03:49

## 2018-07-10 RX ADMIN — INSULIN LISPRO 1 UNITS: 100 INJECTION, SOLUTION INTRAVENOUS; SUBCUTANEOUS at 08:49

## 2018-07-10 RX ADMIN — INSULIN GLARGINE 10 UNITS: 100 INJECTION, SOLUTION SUBCUTANEOUS at 08:48

## 2018-07-10 RX ADMIN — INSULIN LISPRO 1 UNITS: 100 INJECTION, SOLUTION INTRAVENOUS; SUBCUTANEOUS at 12:09

## 2018-07-10 ASSESSMENT — PAIN SCALES - GENERAL
PAINLEVEL_OUTOF10: 0
PAINLEVEL_OUTOF10: 0

## 2018-07-10 NOTE — PROGRESS NOTES
7/10/2018 9:02 AM  Service: Urology  Group: [facility] (Siddhartha/Jake/Joya)    Daralene Felty  [id number]    Subjective:  Afebrile  Fatigue  Feeling better otherwise  Voiding comfortably      Review of Systems  Respiratory: negative  Cardiovascular: negative  Gastrointestinal: negative  Hematologic/lymphatic: negative  Musculoskeletal:negative  Neurological: negative  Endocrine: negative    Scheduled Meds:   levothyroxine  50 mcg Oral Daily    sodium chloride flush  10 mL Intravenous 2 times per day    insulin lispro  3 Units Subcutaneous TID WC    insulin lispro  0-6 Units Subcutaneous TID WC    insulin lispro  0-3 Units Subcutaneous Nightly    insulin glargine  10 Units Subcutaneous QAM AC    cefepime  250 mg Intravenous Q24H    ceFAZolin (ANCEF) IVPB  500 mg Intravenous Q24H       Objective:  Vitals:    07/10/18 0750   BP: (!) 152/70   Pulse:    Resp: 12   Temp: 98.5 °F (36.9 °C)   SpO2: 99%         Allergies: Patient has no known allergies. General Appearance: alert and oriented to person, place and time and in no acute distress  Skin: no rash or erythema  Head: normocephalic and atraumatic  Pulmonary/Chest:  normal air movement, no respiratory distress   Abdomen: soft, non-tender, non-distended    Labs:     Recent Labs      07/10/18   0816   NA  137   K  4.3   CL  102   CO2  23   BUN  14   CREATININE  1.0   GLUCOSE  154*   CALCIUM  7.9*       Lab Results   Component Value Date    HGB 8.6 07/10/2018    HCT 26.9 07/10/2018         Assessment/Plan:  Almita Roe for discharge from a gu standpoint  Will need a metabolic workup as outpt  Will need definitive stone procedure.  3-4 weeks        Corrine Parnell, APRN - CNP   [facility]

## 2018-07-10 NOTE — PROGRESS NOTES
peripheral edema. No ulcers. Neurologic:  Alert x 3. No focal deficit. Cranial nerves grossly intact. Skin:  No petechia. No hemorrhage. No wounds. CBC with Differential:    Lab Results   Component Value Date    WBC 6.8 07/09/2018    RBC 2.85 07/09/2018    HGB 8.0 07/09/2018    HCT 24.6 07/09/2018     07/09/2018    MCV 86.3 07/09/2018    MCH 28.1 07/09/2018    MCHC 32.5 07/09/2018    RDW 13.3 07/09/2018    SEGSPCT 59 06/05/2013    BANDSPCT 1 10/23/2015    METASPCT 0.5 07/08/2018    LYMPHOPCT 20.9 07/09/2018    MONOPCT 11.6 07/09/2018    BASOPCT 0.4 07/09/2018    MONOSABS 0.79 07/09/2018    LYMPHSABS 1.43 07/09/2018    EOSABS 0.08 07/09/2018    BASOSABS 0.03 07/09/2018     CMP:    Lab Results   Component Value Date     07/09/2018    K 4.0 07/09/2018    K 7.3 07/07/2018     07/09/2018    CO2 29 07/09/2018    BUN 35 07/09/2018    CREATININE 1.9 07/09/2018    GFRAA 34 07/09/2018    LABGLOM 28 07/09/2018    GLUCOSE 150 07/09/2018    PROT 5.7 07/09/2018    LABALBU 2.9 07/09/2018    CALCIUM 7.2 07/09/2018    BILITOT 0.5 07/09/2018    ALKPHOS 54 07/09/2018    AST 13 07/09/2018    ALT 10 07/09/2018       Other Data:      Intake/Output Summary (Last 24 hours) at 07/09/18 2331  Last data filed at 07/09/18 1700   Gross per 24 hour   Intake             2523 ml   Output             2125 ml   Net              398 ml         Scheduled Medications:   levothyroxine  50 mcg Oral Daily    sodium chloride flush  10 mL Intravenous 2 times per day    insulin lispro  3 Units Subcutaneous TID WC    insulin lispro  0-6 Units Subcutaneous TID WC    insulin lispro  0-3 Units Subcutaneous Nightly    insulin glargine  10 Units Subcutaneous QAM AC    [START ON 7/10/2018] cefepime  250 mg Intravenous Q24H    ceFAZolin (ANCEF) IVPB  500 mg Intravenous Q24H         Infusion Medications:   lactated ringers 125 mL/hr at 07/09/18 1917    dextrose         Assessment:   1.  Sepsis secondary to bilateral obstructive

## 2018-07-10 NOTE — PROGRESS NOTES
Patient and family member refused to wait for transport, was escorted to truck by me via wheelchair.  Nubia Ann RN

## 2018-07-13 LAB
BLOOD CULTURE, ROUTINE: NORMAL
CULTURE, BLOOD 2: NORMAL
TROPONIN: <0.01 NG/ML (ref 0–0.03)

## 2018-07-16 ENCOUNTER — PREP FOR PROCEDURE (OUTPATIENT)
Dept: UROLOGY | Age: 53
End: 2018-07-16

## 2018-07-16 RX ORDER — SODIUM CHLORIDE 9 MG/ML
INJECTION, SOLUTION INTRAVENOUS CONTINUOUS
Status: CANCELLED | OUTPATIENT
Start: 2018-07-16 | End: 2019-07-16

## 2018-07-16 RX ORDER — SODIUM CHLORIDE 0.9 % (FLUSH) 0.9 %
10 SYRINGE (ML) INJECTION PRN
Status: CANCELLED | OUTPATIENT
Start: 2018-07-16 | End: 2019-07-16

## 2018-07-16 RX ORDER — SODIUM CHLORIDE 0.9 % (FLUSH) 0.9 %
10 SYRINGE (ML) INJECTION EVERY 12 HOURS SCHEDULED
Status: CANCELLED | OUTPATIENT
Start: 2018-07-16 | End: 2019-07-16

## 2018-07-18 RX ORDER — RANITIDINE 150 MG/1
150 TABLET ORAL DAILY
COMMUNITY

## 2018-07-18 RX ORDER — SULFAMETHOXAZOLE AND TRIMETHOPRIM 800; 160 MG/1; MG/1
TABLET ORAL
Refills: 0 | COMMUNITY
Start: 2018-07-03 | End: 2018-07-18

## 2018-07-18 NOTE — PROGRESS NOTES
[facility] 38 PRE-ADMISSION TESTING GENERAL INSTRUCTIONS- [facility]-phone number:[phone]    GENERAL INSTRUCTIONS  [x] Antibacterial Soap shower Night before and/or AM of Surgery  [] Larry wipe instruction sheet and wipes given. [x] Nothing by mouth after midnight, including gum, candy, mints, or water. [x] You may brush your teeth, gargle, but do NOT swallow water. []Hibiclens shower  the night before and the morning of surgery. Do not use             Hibiclens on your face or head. [x]No smoking, chewing tobacco, illegal drugs, or alcohol within 24 hours of your surgery. [x] Jewelry, valuables or body piercing's should not be brought to the hospital. All body and/or tongue piercing's must be removed prior to arriving to hospital.  ALL hair pins must be removed. [x] Do not wear makeup, lotions, powders, deodorant. Nail polish as directed by the nurse. [x] Arrange transportation to and from the hospital.  Arrange for someone to be with you for the remainder of the day and for 24 hours after your procedure due to having had anesthesia. [x] Bring insurance card and photo ID.  [] Transfusion Bracelet: Please bring with you to hospital, day of surgery  [] Bring urine specimen day of surgery. Any small container is acceptable. [] Use inhalers the morning of surgery and bring with you to hospital.   []Bring copy of living will or healthcare power of  papers to be placed in your electronic record. [] CPAP/BI-PAP: Please bring your machine if you are to spend the night in the hospital.     ENDOSCOPY INSTRUCTIONS:   [] Bowel prep instructions reviewed. [] Nothing by mouth after midnight, including gum, candy, mints, or water.  [] You may brush your teeth, gargle, but do NOT swallow water. [] Do not wear makeup, lotions, powders, deodorant. Nail polish as directed by the nurse.   [] Arrange transportation to and from the hospital.  Arrange for someone to be with you for the remainder of the day due to having had anesthesia. PARKING INSTRUCTIONS:   · [x] Arrival [id number][x] Parking lot 1 is located on [facility] (the corner of [facility] and [facility]). To enter, press the button and the gate will lift. A free token will be provided to exit the lot. One car per patient is allowed to park in this lot. All other cars are to park on [address] [place] [address] Street either in the parking garage or the handicap lot. [] Free  parking is available on [address] [place] [address]. · [] To reach the [facility] lobby from [address] [place] [address], upon entering the hospital, take elevator B to the 3rd floor. EDUCATION INSTRUCTIONS:      [] Knee or hip replacement booklet & exercise pamphlets given. [] Aditya Magaña placed in chart. [] Pre-admission Testing educational folder given  [] Incentive Spirometry,coughing & deep breathing exercises reviewed. []Medication information sheet(s)   [x]Fluoroscopy-Xray used in surgery reviewed with patient. Educational pamphlet placed in chart. [x]Pain: Post-op pain is normal and to be expected. You will be asked to rate your pain from 0-10(a zero is not acceptable-education is needed). Your post-op pain goal is:3  [] Ask your nurse for your pain medication. [] Joint camp offered. [] Joint replacement booklets given. []Other     MEDICATION INSTRUCTIONS:   []Bring a complete list of your medications, please write the last time you took the medicine, give this list to the nurse. [x] Take the following medications the morning of surgery with 1-2 ounces of water: SEE MED LIST  [] Stop herbal supplements and vitamins 5 days before your surgery. [] DO NOT take any diabetic medicine the morning of surgery. Follow instructions for insulin the day before surgery. [x] If you are diabetic and your blood sugar is low or you feel symptomatic, you may drink 1-2 ounces of apple juice or take a glucose tablet.   The morning of your procedure, you may call the pre-op area if you have concerns about your blood sugar [phone]. [] Use your inhalers the morning of surgery. Bring your emergency inhaler with you day of surgery. [] Follow physician instructions regarding any blood thinners you may be taking. WHAT TO EXPECT:  [x] The day of surgery you will be greeted and  checked in by the [employer] .  A nurse will greet you in accordance to the time you are needed in the pre-op area to prepare you for surgery. Please do not be discouraged if you are not greeted in the order you arrive as there are many variables that are involved in patient preparation. Your patience is greatly appreciated as you wait for your nurse. Please bring in items such as: books, magazines, newspapers, electronics, or any other items  to occupy your time in the waiting area. [x]  Delays may occur with surgery and staff will make a sincere effort to keep you informed of delays. If any delays occur with your procedure, we apologize ahead of time for your inconvenience as we recognize the value of your time.

## 2018-07-25 ENCOUNTER — ANESTHESIA (OUTPATIENT)
Dept: OPERATING ROOM | Age: 53
End: 2018-07-25
Payer: COMMERCIAL

## 2018-07-25 ENCOUNTER — HOSPITAL ENCOUNTER (OUTPATIENT)
Age: 53
Setting detail: OUTPATIENT SURGERY
Discharge: HOME | End: 2018-07-25
Attending: UROLOGY | Admitting: UROLOGY
Payer: COMMERCIAL

## 2018-07-25 ENCOUNTER — APPOINTMENT (OUTPATIENT)
Dept: GENERAL RADIOLOGY | Age: 53
End: 2018-07-25
Attending: UROLOGY
Payer: COMMERCIAL

## 2018-07-25 ENCOUNTER — ANESTHESIA EVENT (OUTPATIENT)
Dept: OPERATING ROOM | Age: 53
End: 2018-07-25
Payer: COMMERCIAL

## 2018-07-25 VITALS
TEMPERATURE: 98.1 F | WEIGHT: 200 LBS | HEIGHT: 62 IN | DIASTOLIC BLOOD PRESSURE: 58 MMHG | RESPIRATION RATE: 16 BRPM | SYSTOLIC BLOOD PRESSURE: 112 MMHG | OXYGEN SATURATION: 98 % | BODY MASS INDEX: 36.8 KG/M2 | HEART RATE: 60 BPM

## 2018-07-25 VITALS — OXYGEN SATURATION: 99 % | SYSTOLIC BLOOD PRESSURE: 120 MMHG | DIASTOLIC BLOOD PRESSURE: 71 MMHG

## 2018-07-25 DIAGNOSIS — Z01.818 PRE-OP EXAM: ICD-10-CM

## 2018-07-25 DIAGNOSIS — N20.0 KIDNEY STONE: ICD-10-CM

## 2018-07-25 DIAGNOSIS — N20.1 URETERAL CALCULI: ICD-10-CM

## 2018-07-25 LAB
GLUCOSE BLD-MCNC: 130 MG/DL
METER GLUCOSE: 130 MG/DL (ref 70–110)
METER GLUCOSE: 160 MG/DL (ref 70–110)

## 2018-07-25 PROCEDURE — 6370000000 HC RX 637 (ALT 250 FOR IP): Performed by: UROLOGY

## 2018-07-25 PROCEDURE — 3700000001 HC ADD 15 MINUTES (ANESTHESIA): Performed by: UROLOGY

## 2018-07-25 PROCEDURE — 6360000002 HC RX W HCPCS: Performed by: NURSE ANESTHETIST, CERTIFIED REGISTERED

## 2018-07-25 PROCEDURE — 6360000002 HC RX W HCPCS: Performed by: NURSE PRACTITIONER

## 2018-07-25 PROCEDURE — C1758 CATHETER, URETERAL: HCPCS | Performed by: UROLOGY

## 2018-07-25 PROCEDURE — C1894 INTRO/SHEATH, NON-LASER: HCPCS | Performed by: UROLOGY

## 2018-07-25 PROCEDURE — 7100000010 HC PHASE II RECOVERY - FIRST 15 MIN: Performed by: UROLOGY

## 2018-07-25 PROCEDURE — C2617 STENT, NON-COR, TEM W/O DEL: HCPCS | Performed by: UROLOGY

## 2018-07-25 PROCEDURE — 82962 GLUCOSE BLOOD TEST: CPT

## 2018-07-25 PROCEDURE — 82365 CALCULUS SPECTROSCOPY: CPT

## 2018-07-25 PROCEDURE — 3600000014 HC SURGERY LEVEL 4 ADDTL 15MIN: Performed by: UROLOGY

## 2018-07-25 PROCEDURE — 3600000004 HC SURGERY LEVEL 4 BASE: Performed by: UROLOGY

## 2018-07-25 PROCEDURE — 6360000002 HC RX W HCPCS: Performed by: UROLOGY

## 2018-07-25 PROCEDURE — 88300 SURGICAL PATH GROSS: CPT

## 2018-07-25 PROCEDURE — 3209999900 FLUORO FOR SURGICAL PROCEDURES

## 2018-07-25 PROCEDURE — 3700000000 HC ANESTHESIA ATTENDED CARE: Performed by: UROLOGY

## 2018-07-25 PROCEDURE — C1773 RET DEV, INSERTABLE: HCPCS | Performed by: UROLOGY

## 2018-07-25 PROCEDURE — 87088 URINE BACTERIA CULTURE: CPT

## 2018-07-25 PROCEDURE — 2709999900 HC NON-CHARGEABLE SUPPLY: Performed by: UROLOGY

## 2018-07-25 PROCEDURE — 2580000003 HC RX 258: Performed by: NURSE PRACTITIONER

## 2018-07-25 PROCEDURE — A9577 INJ MULTIHANCE: HCPCS | Performed by: UROLOGY

## 2018-07-25 PROCEDURE — 7100000011 HC PHASE II RECOVERY - ADDTL 15 MIN: Performed by: UROLOGY

## 2018-07-25 PROCEDURE — 2580000003 HC RX 258: Performed by: NURSE ANESTHETIST, CERTIFIED REGISTERED

## 2018-07-25 PROCEDURE — 2720000010 HC SURG SUPPLY STERILE: Performed by: UROLOGY

## 2018-07-25 DEVICE — UNIVERSA FIRM URETERAL STENT AND POSITIONER WITH HYDROPHILIC COATING
Type: IMPLANTABLE DEVICE | Site: URETER | Status: FUNCTIONAL
Brand: UNIVERSA

## 2018-07-25 RX ORDER — PROPOFOL 10 MG/ML
INJECTION, EMULSION INTRAVENOUS CONTINUOUS PRN
Status: DISCONTINUED | OUTPATIENT
Start: 2018-07-25 | End: 2018-07-25 | Stop reason: SDUPTHER

## 2018-07-25 RX ORDER — HYDROCODONE BITARTRATE AND ACETAMINOPHEN 5; 325 MG/1; MG/1
1 TABLET ORAL EVERY 4 HOURS PRN
Qty: 15 TABLET | Refills: 0 | Status: SHIPPED | OUTPATIENT
Start: 2018-07-25 | End: 2018-08-01

## 2018-07-25 RX ORDER — MORPHINE SULFATE 2 MG/ML
1 INJECTION, SOLUTION INTRAMUSCULAR; INTRAVENOUS EVERY 5 MIN PRN
Status: DISCONTINUED | OUTPATIENT
Start: 2018-07-25 | End: 2018-07-25 | Stop reason: HOSPADM

## 2018-07-25 RX ORDER — FENTANYL CITRATE 50 UG/ML
INJECTION, SOLUTION INTRAMUSCULAR; INTRAVENOUS PRN
Status: DISCONTINUED | OUTPATIENT
Start: 2018-07-25 | End: 2018-07-25 | Stop reason: SDUPTHER

## 2018-07-25 RX ORDER — LABETALOL HYDROCHLORIDE 5 MG/ML
10 INJECTION, SOLUTION INTRAVENOUS EVERY 10 MIN PRN
Status: DISCONTINUED | OUTPATIENT
Start: 2018-07-25 | End: 2018-07-25 | Stop reason: HOSPADM

## 2018-07-25 RX ORDER — PROMETHAZINE HYDROCHLORIDE 25 MG/ML
12.5 INJECTION, SOLUTION INTRAMUSCULAR; INTRAVENOUS
Status: DISCONTINUED | OUTPATIENT
Start: 2018-07-25 | End: 2018-07-25 | Stop reason: HOSPADM

## 2018-07-25 RX ORDER — SODIUM CHLORIDE 9 MG/ML
INJECTION, SOLUTION INTRAVENOUS CONTINUOUS
Status: DISCONTINUED | OUTPATIENT
Start: 2018-07-25 | End: 2018-07-25 | Stop reason: HOSPADM

## 2018-07-25 RX ORDER — PHENAZOPYRIDINE HYDROCHLORIDE 200 MG/1
200 TABLET, FILM COATED ORAL 3 TIMES DAILY PRN
Qty: 21 TABLET | Refills: 2 | Status: SHIPPED | OUTPATIENT
Start: 2018-07-25 | End: 2018-07-28

## 2018-07-25 RX ORDER — SODIUM CHLORIDE 0.9 % (FLUSH) 0.9 %
10 SYRINGE (ML) INJECTION PRN
Status: DISCONTINUED | OUTPATIENT
Start: 2018-07-25 | End: 2018-07-25 | Stop reason: HOSPADM

## 2018-07-25 RX ORDER — MIDAZOLAM HYDROCHLORIDE 1 MG/ML
INJECTION INTRAMUSCULAR; INTRAVENOUS PRN
Status: DISCONTINUED | OUTPATIENT
Start: 2018-07-25 | End: 2018-07-25 | Stop reason: SDUPTHER

## 2018-07-25 RX ORDER — MORPHINE SULFATE 2 MG/ML
2 INJECTION, SOLUTION INTRAMUSCULAR; INTRAVENOUS EVERY 4 HOURS PRN
Status: DISCONTINUED | OUTPATIENT
Start: 2018-07-25 | End: 2018-07-25 | Stop reason: HOSPADM

## 2018-07-25 RX ORDER — TAMSULOSIN HYDROCHLORIDE 0.4 MG/1
0.4 CAPSULE ORAL DAILY
Qty: 10 CAPSULE | Refills: 11 | Status: SHIPPED | OUTPATIENT
Start: 2018-07-25 | End: 2020-09-17 | Stop reason: ALTCHOICE

## 2018-07-25 RX ORDER — SODIUM CHLORIDE 9 MG/ML
INJECTION, SOLUTION INTRAVENOUS CONTINUOUS PRN
Status: DISCONTINUED | OUTPATIENT
Start: 2018-07-25 | End: 2018-07-25 | Stop reason: SDUPTHER

## 2018-07-25 RX ORDER — SODIUM CHLORIDE 0.9 % (FLUSH) 0.9 %
10 SYRINGE (ML) INJECTION EVERY 12 HOURS SCHEDULED
Status: DISCONTINUED | OUTPATIENT
Start: 2018-07-25 | End: 2018-07-25 | Stop reason: HOSPADM

## 2018-07-25 RX ORDER — CEPHALEXIN 500 MG/1
500 CAPSULE ORAL 3 TIMES DAILY
Qty: 21 CAPSULE | Refills: 0 | Status: SHIPPED | OUTPATIENT
Start: 2018-07-25 | End: 2018-08-01

## 2018-07-25 RX ORDER — ONDANSETRON 2 MG/ML
4 INJECTION INTRAMUSCULAR; INTRAVENOUS EVERY 6 HOURS PRN
Status: DISCONTINUED | OUTPATIENT
Start: 2018-07-25 | End: 2018-07-25 | Stop reason: HOSPADM

## 2018-07-25 RX ORDER — OXYCODONE HYDROCHLORIDE AND ACETAMINOPHEN 5; 325 MG/1; MG/1
1 TABLET ORAL EVERY 6 HOURS PRN
Status: DISCONTINUED | OUTPATIENT
Start: 2018-07-25 | End: 2018-07-25 | Stop reason: HOSPADM

## 2018-07-25 RX ADMIN — PROPOFOL 50 MCG/KG/MIN: 10 INJECTION, EMULSION INTRAVENOUS at 09:55

## 2018-07-25 RX ADMIN — FENTANYL CITRATE 50 MCG: 50 INJECTION, SOLUTION INTRAMUSCULAR; INTRAVENOUS at 11:10

## 2018-07-25 RX ADMIN — FENTANYL CITRATE 50 MCG: 50 INJECTION, SOLUTION INTRAMUSCULAR; INTRAVENOUS at 09:55

## 2018-07-25 RX ADMIN — MIDAZOLAM 2 MG: 1 INJECTION INTRAMUSCULAR; INTRAVENOUS at 09:52

## 2018-07-25 RX ADMIN — SODIUM CHLORIDE: 9 INJECTION, SOLUTION INTRAVENOUS at 09:19

## 2018-07-25 RX ADMIN — FENTANYL CITRATE 50 MCG: 50 INJECTION, SOLUTION INTRAMUSCULAR; INTRAVENOUS at 10:07

## 2018-07-25 RX ADMIN — SODIUM CHLORIDE: 9 INJECTION, SOLUTION INTRAVENOUS at 09:52

## 2018-07-25 RX ADMIN — ONDANSETRON 4 MG: 2 INJECTION INTRAMUSCULAR; INTRAVENOUS at 13:31

## 2018-07-25 RX ADMIN — FENTANYL CITRATE 50 MCG: 50 INJECTION, SOLUTION INTRAMUSCULAR; INTRAVENOUS at 10:41

## 2018-07-25 RX ADMIN — OXYCODONE HYDROCHLORIDE AND ACETAMINOPHEN 1 TABLET: 5; 325 TABLET ORAL at 12:31

## 2018-07-25 RX ADMIN — CEFAZOLIN SODIUM 2 G: 2 SOLUTION INTRAVENOUS at 09:59

## 2018-07-25 ASSESSMENT — PAIN DESCRIPTION - LOCATION
LOCATION: ABDOMEN
LOCATION: BACK

## 2018-07-25 ASSESSMENT — PAIN SCALES - GENERAL
PAINLEVEL_OUTOF10: 5
PAINLEVEL_OUTOF10: 8
PAINLEVEL_OUTOF10: 0
PAINLEVEL_OUTOF10: 5
PAINLEVEL_OUTOF10: 7

## 2018-07-25 ASSESSMENT — PAIN DESCRIPTION - PAIN TYPE
TYPE: SURGICAL PAIN

## 2018-07-25 ASSESSMENT — PULMONARY FUNCTION TESTS
PIF_VALUE: 0

## 2018-07-25 ASSESSMENT — PAIN DESCRIPTION - DESCRIPTORS
DESCRIPTORS: DISCOMFORT;PRESSURE

## 2018-07-25 ASSESSMENT — PAIN DESCRIPTION - ORIENTATION
ORIENTATION: LOWER;MID
ORIENTATION: LOWER
ORIENTATION: LOWER;MID
ORIENTATION: LOWER;MID

## 2018-07-25 ASSESSMENT — PAIN - FUNCTIONAL ASSESSMENT: PAIN_FUNCTIONAL_ASSESSMENT: 0-10

## 2018-07-25 NOTE — H&P (VIEW-ONLY)
10.8 (L) 07/07/2018    HCT 31.2 (L) 07/07/2018     07/07/2018     (L) 07/07/2018    K 7.3 (HH) 07/07/2018    CL 79 (L) 07/07/2018    CREATININE 9.5 (HH) 07/07/2018     (H) 07/07/2018    CO2 8 (LL) 07/07/2018    GLUCOSE 162 (H) 07/07/2018    ALT 17 09/01/2016    AST 18 09/01/2016     No results found for: CKTOTAL, CKMB, CKMBINDEX, TROPONINI  No results for input(s): BNP in the last 72 hours. Radiology-  Ct Abdomen Pelvis Wo Contrast    Result Date: 7/7/2018  Location:200 Exam: CT ABDOMEN PELVIS WO CONTRAST Comparison: None History:  Right flank pain and vomiting FINDINGS: Noncontrast spiral CT the abdomen with coronal and sagittal reconstructions. Automated dose exposure control was utilized for this examination. Visualized lung bases are clear. There is bilateral moderate hydronephrosis. There are bilateral nonobstructing renal calculi. There is a calculus in the right ureter measuring 5.3 x 5.3 cm in diameter, with a Hounsfield density of 340. The calculus is located at the inferior endplate of L3. There are 2 calculi in the proximal ureter on the left, located at the L3 level one measuring 5 x 5 mm, the other measuring 8.5 x 5.5 mm. The other abdominal and retroperitoneal organs on this limited noncontrast study are unremarkable. There is no evidence of adenopathy or ascites. The visualized intestinal structures are normal. The appendix is normal. The abdominal aorta is normal in size. The pelvic organs are within normal limits. No evidence of pelvic mass adenopathy or free air. 1. Bilateral obstructing ureteral calculi, with moderate hydronephrosis, as described above.       EKG-   Sinus tach     Therapy in ED-   Medications   sodium chloride flush 0.9 % injection 10 mL (not administered)   cefepime (MAXIPIME) 2 g IVPB extended (mini-bag) (2 g Intravenous New Bag 7/7/18 6188)   0.9 % sodium chloride bolus (2,979 mLs Intravenous New Bag 7/7/18 2513)   sodium bicarbonate 8.4 % injection 50 mEq (50 mEq Intravenous Given 7/7/18 2237)   fentaNYL (SUBLIMAZE) injection 50 mcg (not administered)   sodium polystyrene (KAYEXALATE) 15 GM/60ML suspension 30 g (not administered)   sodium bicarbonate 150 mEq in dextrose 5 % 1,000 mL infusion (not administered)   ondansetron (ZOFRAN) injection 4 mg (4 mg Intravenous Given 7/7/18 2117)   albuterol (PROVENTIL) nebulizer solution 10 mg (10 mg Nebulization Given 7/7/18 2222)   insulin regular (HUMULIN R;NOVOLIN R) injection 10 Units (10 Units Intravenous Given 7/7/18 2232)   dextrose 50 % solution 50 mL (50 mLs Intravenous Given 7/7/18 2234)         Past Medical History:   Diagnosis Date    Acute renal failure (ARF) ([facility] [address] 10/25/2015    Diabetes mellitus ([facility] [address]     Fibroid, uterine abnormal cells on cervix craming and clotting    Hypertension 10/25/2015    Kidney stone     Thyroid disease        Past Surgical History:   Procedure Laterality Date    DILATION AND CURETTAGE OF UTERUS      HYSTERECTOMY  Jun 2013    Robotic Assisted Total Hysterectomy w/BSO, Cystoscopy    LITHOTRIPSY      TONSILLECTOMY         Family History  History reviewed. No pertinent family history. Social History  Patient lives at home . Employment: no  Illicit drug use- no  TOBACCO:   reports that she has quit smoking. She quit after 0.50 years of use. She has never used smokeless tobacco.  ETOH:   reports that she does not drink alcohol. Home Medications  No current facility-administered medications on file prior to encounter.       Current Outpatient Prescriptions on File Prior to Encounter   Medication Sig Dispense Refill    tamsulosin (FLOMAX) 0.4 MG capsule Take 1 capsule by mouth daily 30 capsule 0    metFORMIN (GLUCOPHAGE) 1000 MG tablet Take 1,000 mg by mouth 2 times daily (with meals)      insulin lispro (HUMALOG) 100 UNIT/ML injection vial Inject 0-3 Units into the skin nightly 1 vial 3    insulin lispro (HUMALOG) 100 UNIT/ML injection vial Inject 0-6 Units into the skin 3 times daily (with meals) 1 vial 3    levothyroxine (SYNTHROID) 50 MCG tablet Take 50 mcg by mouth Daily.  ketorolac (TORADOL) 10 MG tablet Take 1 tablet by mouth every 6 hours as needed for Pain 20 tablet 0    lidocaine (XYLOCAINE) 2 % jelly Apply topically to affected area three times a day as needed for pain. 30 mL 0    cyclobenzaprine (FLEXERIL) 10 MG tablet Take 1 tablet by mouth 3 times daily as needed for Muscle spasms (May cause drowsiness) 15 tablet 0    insulin glargine (LANTUS) 100 UNIT/ML injection vial Inject 10 Units into the skin nightly 1 vial 3       Allergies  No Known Allergies    Review of Systems  Please see HPI above. All bolded are positive. All un-bolded are negative. Gen: fever, chills, fatigue, weakness, diaphoresis, increase in thirst, unintentional weight change, loss of appetite  Head: headache, vision change, hearing loss  Chest: chest pain, chest heaviness, palpitations  Lungs: shortness of breath, wheezing, coughing  Abdomen: abdominal pain, nausea, vomiting, diarrhea, constipation, melena, hematochezia, hematemesis  Extremities: lower extremity edema, myalgias, arthralgias  Urinary: dysuria, hematuria, or increase in frequency  Neurologic: lightheadedness, dizziness, confusion, syncope  Psychiatric: depression, suicidal ideation, or anxiety    Objective  Vitals:    07/07/18 2041   BP: (!) 157/72   Pulse: 100   Resp: 18   Temp: 98.5 °F (36.9 °C)   SpO2: 98%       Physical Exam:  General: Awake, alert, oriented to person, place, time, and purpose, appears stated age, cooperative, no acute distress, pleasant   Head: Normocephalic, atraumatic  Eyes: Conjunctivae/corneas clear, Sclera non icteric  Mouth: Mucous membranes dry  Neck: No JVD, no adenopathy, no carotid bruit, neck is supple, trachea is midline  Back: ROM normal, No CVA tenderness. Lungs: Clear to auscultation bilaterally. No retractions or use of accessory muscles. No vocal fremitus.  No rhonchi, crackle or rale. Heart: Regular rate and regular rhythm, no murmur, normal S1, S2  Abdomen: Soft, non-tender; bowel sounds normal; no masses, no organomegaly  Extremities:No lower extremity edema, extremities atraumatic, no cyanosis, no clubbing, 2+ pedal pulses palpated  Skin: Normal color, normal texture, normal turgor, no rashes, no lesions  Neurologic: Cranial nerves II through XII intact    Mircobiology  Urine and Blood  cultures have been sent. Assessment  Sepsis secondary to bilateral 15 ureter calculi  Acute renal failure secondary to above  Hyperkalemia  Metabolic acidosis  Insulin-dependent diabetes mellitus          Plan  Urology was consult from the emergency room. Patient is to the OR for removal of the bilateral obstructing ureteral calculi. Continue the patient aggressive IV fluid hydration overnight with bicarbonate infusion. Repeat BMP this evening. We'll treat IV insulin and albuterol continues to be elevated. Feel improvement kidney function in the morning. Consult to nephrology. · Continue to manage other medical conditions listed above as previously managed. · Routine labs in am  · DVT prophylaxis. · Please see orders for further management and care. · This patient was discussed with Dr. Iraida Porter. Eusebio Moise DO, PGY3  7/7/2018  10:39 PM    NOTE: This report was transcribed using voice recognition software.  Every effort was made to ensure accuracy; however, inadvertent computerized transcription errors may be present

## 2018-07-25 NOTE — H&P (VIEW-ONLY)
history is not on file. Review of Systems:  Respiratory: negative for cough and hemoptysis  Cardiovascular: negative for chest pain but has dyspnea  Gastrointestinal: negative for abdominal pain, diarrhea, nausea and vomiting  Derm: negative for rash and skin lesion(s)  Neurological: negative for seizures and tremors  Endocrine: Diabetes mellitus. Hypertension. She has had hypothyroidism and is on medication   : As above in the HPI, otherwise negative  All other reviews are negative    Physical Exam:   Vitals: BP (!) 137/57   Pulse 128   Temp 97.9 °F (36.6 °C)   Resp 28   Ht 5' 2\" (1.575 m)   Wt 218 lb 14.4 oz (99.3 kg)   LMP 05/12/2013   SpO2 99%   BMI 40.04 kg/m²   General:  Awake, alert, oriented X 3. Well developed, well nourished, well groomed. She has some respiratory distress HEENT:  Normocephalic, atraumatic. Pupils equal, round. No scleral icterus. No conjunctival injection. Normal lips, teeth, and gums. No nasal discharge. Neck:  Supple, no masses. Heart:  RRR  Lungs:  No audible wheezing. Respirations symmetric and non-labored. Abdomen:  soft, nontender, no masses, no organomegaly, no peritoneal signs her abdomen is obese  Extremities:  No clubbing, cyanosis, or edema  Skin:  Warm and dry, no open lesions or rashes  Neuro: No motor or sensory deficits in the 4 quadrant extremities  Rectal: deferred  Genitalia:  Deferred to the operating room    Labs:   Recent Labs      07/07/18 2125   WBC  14.6*   RBC  3.67   HGB  10.8*   HCT  31.2*   MCV  85.0   MCH  29.4   MCHC  34.6*   RDW  12.9   PLT  300   MPV  9.8       Recent Labs      07/07/18 2125  07/07/18   2313   CREATININE  9.5*  9.3       Images:  CT ABDOMEN PELVIS WO CONTRAST   Final Result   1. Bilateral obstructing ureteral calculi, with moderate   hydronephrosis, as described above.       XR Urogram W WO Kub W WO Tomogram    (Results Pending)       Assessment: Eryn Jeffrey 50 y.o. female   This is a urological emergency with

## 2018-07-25 NOTE — ANESTHESIA PRE PROCEDURE
kg)   07/09/18 227 lb 14.4 oz (103.4 kg)   06/27/18 195 lb (88.5 kg)     Body mass index is 36.58 kg/m². CBC:   Lab Results   Component Value Date    WBC 7.1 07/10/2018    RBC 3.05 07/10/2018    HGB 8.6 07/10/2018    HCT 26.9 07/10/2018    MCV 88.2 07/10/2018    RDW 13.1 07/10/2018     07/10/2018       CMP:   Lab Results   Component Value Date     07/10/2018    K 4.3 07/10/2018    K 7.3 07/07/2018     07/10/2018    CO2 23 07/10/2018    BUN 14 07/10/2018    CREATININE 1.0 07/10/2018    GFRAA >60 07/10/2018    LABGLOM 58 07/10/2018    GLUCOSE 154 07/10/2018    PROT 6.1 07/10/2018    CALCIUM 7.9 07/10/2018    BILITOT 0.4 07/10/2018    ALKPHOS 58 07/10/2018    AST 16 07/10/2018    ALT 11 07/10/2018       POC Tests: No results for input(s): POCGLU, POCNA, POCK, POCCL, POCBUN, POCHEMO, POCHCT in the last 72 hours. Coags: No results found for: PROTIME, INR, APTT    HCG (If Applicable):   Lab Results   Component Value Date    PREGTESTUR NEGATIVE 06/04/2013        ABGs: No results found for: PHART, PO2ART, TJA0PIB, GBX4DJI, BEART, X2LPMXAI     Type & Screen (If Applicable):  No results found for: LABABO, LABRH    Anesthesia Evaluation    Airway: Mallampati: I  TM distance: >3 FB   Neck ROM: full  Mouth opening: > = 3 FB Dental:          Pulmonary: breath sounds clear to auscultation                             Cardiovascular:    (+) hypertension:,         Rhythm: regular  Rate: normal                    Neuro/Psych:               GI/Hepatic/Renal:   (+) GERD:, renal disease (ARF 2 weeks ago, due to bilateral ureteral obstruction; now resolved. ): ARF,           Endo/Other:    (+) Diabetesusing insulin, . Abdominal:   (+) obese,         Vascular:                                        Anesthesia Plan      MAC     ASA 3       Induction: intravenous. Anesthetic plan and risks discussed with patient. Plan discussed with CRNA.             P feels well this morning and has been NPO

## 2018-07-25 NOTE — H&P (VIEW-ONLY)
urological emergency with bilateral ureteral obstruction with azotemia. Her creatinine is 9.5 and her BUN is 102. Her potassium was 7.3 and this has been treated to medically.  Her white count is 14,600     Plan:     Urgent cystoscopy retrograde pyelogram and insertion of stents bilateral  Definitive surgery will be done at a later time endoscopically  Metabolic evaluation be done at a later time  She will go to the ICU postoperatively as a fluid management and possible urosepsis will be her immediate concerns     Updated 7/16/18  Pt for definitive ov her obstruction with creatinine improved to 1.0. K 4.0  Plan cystoscopy, retrograde pyelogram, possible laser lithotripsy is stones found, possible bilateral stent  Otherwise unchanged      Gege Parra, NP-C  [facility]  July 16, 2018

## 2018-07-27 LAB — URINE CULTURE, ROUTINE: NORMAL

## 2018-07-30 LAB
CALCULI COMPOSITION: NORMAL
MASS: 149 MG
STONE DESCRIPTION: NORMAL
STONE NUMBER: 4
STONE SIZE: NORMAL MM

## 2018-08-17 NOTE — DISCHARGE SUMMARY
Discharge Summary    Charline Munroe  :  1965  MRN:  [id number]    Admit date:  2018  Discharge date:  7/10/2018    Admitting Physician:  Shameka Allen DO    Discharge Diagnoses:    Patient Active Problem List   Diagnosis    Acute renal failure (ARF) ([address]    Nausea with vomiting    Hypertension    Ureteral calculi       Consults:  Urology, Nephrology    HPI/Hospital Course: This is a 50year old female patient that was admitted with nausea, vomiting, and flan pain. Was found to have acute renal failure secondary to bilateral kidney stones. Urology was consulted. The patient underwent urgent cystoscopy retrograde pyelogram with insertion of bilateral stents. She tolerated the procedure well. Over the course, she continued to show improvement. Her labs were improving and she was overall feeling better. She was seen and examined and offered no new complaints. She was discharged to home in stable condition. Discharge Medications:        Medication List      CONTINUE taking these medications    cyclobenzaprine 10 MG tablet  Commonly known as:  FLEXERIL  Take 1 tablet by mouth 3 times daily as needed for Muscle spasms (May cause drowsiness)     * insulin lispro 100 UNIT/ML injection vial  Commonly known as:  HUMALOG  Inject 0-3 Units into the skin nightly     * insulin lispro 100 UNIT/ML injection vial  Commonly known as:  HUMALOG  Inject 0-6 Units into the skin 3 times daily (with meals)     levothyroxine 50 MCG tablet  Commonly known as:  SYNTHROID     metFORMIN 1000 MG tablet  Commonly known as:  GLUCOPHAGE     tamsulosin 0.4 MG capsule  Commonly known as:  FLOMAX  Take 1 capsule by mouth daily        * This list has 2 medication(s) that are the same as other medications prescribed for you. Read the directions carefully, and ask your doctor or other care provider to review them with you.             STOP taking these medications    insulin glargine 100 UNIT/ML injection vial  Commonly known as:  LANTUS     ketorolac 10 MG tablet  Commonly known as:  TORADOL        ASK your doctor about these medications    ciprofloxacin 500 MG tablet  Commonly known as:  CIPRO  Take 1 tablet by mouth 2 times daily for 10 days  Ask about: Should I take this medication? Where to Get Your Medications      These medications were sent to [facility] #[id number]- [address] [place] [id number]    Phone:  [phone]   · ciprofloxacin 500 MG tablet         Disposition:   home    Follow-up with her primary care physician in 7-10 days.     Note that over 30 minutes was spent in preparing discharge papers, discussing discharge with patient, medication review, etc.      Signed:  Flavia Paulson  8/16/2018, 9:43 PM

## 2019-02-11 ENCOUNTER — HOSPITAL ENCOUNTER (EMERGENCY)
Age: 54
Discharge: HOME | End: 2019-02-11
Payer: COMMERCIAL

## 2019-02-11 VITALS
OXYGEN SATURATION: 98 % | TEMPERATURE: 98.8 F | SYSTOLIC BLOOD PRESSURE: 159 MMHG | HEART RATE: 74 BPM | RESPIRATION RATE: 18 BRPM | DIASTOLIC BLOOD PRESSURE: 84 MMHG

## 2019-02-11 DIAGNOSIS — K08.89 PAIN, DENTAL: Primary | ICD-10-CM

## 2019-02-11 PROCEDURE — 99212 OFFICE O/P EST SF 10 MIN: CPT

## 2019-02-11 RX ORDER — AMLODIPINE BESYLATE 5 MG/1
5 TABLET ORAL DAILY
COMMUNITY
End: 2020-09-17 | Stop reason: ALTCHOICE

## 2019-02-11 RX ORDER — HYDROCODONE BITARTRATE AND ACETAMINOPHEN 5; 325 MG/1; MG/1
1 TABLET ORAL EVERY 6 HOURS PRN
Qty: 12 TABLET | Refills: 0 | Status: SHIPPED | OUTPATIENT
Start: 2019-02-11 | End: 2019-02-14

## 2019-02-11 RX ORDER — AMOXICILLIN 500 MG/1
500 CAPSULE ORAL 3 TIMES DAILY
Qty: 30 CAPSULE | Refills: 0 | Status: SHIPPED | OUTPATIENT
Start: 2019-02-11 | End: 2019-02-21

## 2019-02-11 ASSESSMENT — PAIN SCALES - GENERAL: PAINLEVEL_OUTOF10: 10

## 2019-02-11 ASSESSMENT — PAIN DESCRIPTION - DESCRIPTORS: DESCRIPTORS: ACHING;DISCOMFORT;THROBBING

## 2019-02-11 ASSESSMENT — PAIN DESCRIPTION - ORIENTATION: ORIENTATION: RIGHT;LOWER

## 2019-02-11 ASSESSMENT — PAIN DESCRIPTION - FREQUENCY: FREQUENCY: CONTINUOUS

## 2019-02-11 ASSESSMENT — PAIN DESCRIPTION - PAIN TYPE: TYPE: ACUTE PAIN

## 2019-02-11 ASSESSMENT — PAIN DESCRIPTION - LOCATION: LOCATION: TEETH

## 2019-02-11 ASSESSMENT — PAIN DESCRIPTION - PROGRESSION: CLINICAL_PROGRESSION: GRADUALLY WORSENING

## 2019-04-12 ENCOUNTER — HOSPITAL ENCOUNTER (EMERGENCY)
Age: 54
Discharge: HOME | End: 2019-04-12
Payer: COMMERCIAL

## 2019-04-12 VITALS
WEIGHT: 200 LBS | SYSTOLIC BLOOD PRESSURE: 110 MMHG | RESPIRATION RATE: 20 BRPM | HEART RATE: 90 BPM | BODY MASS INDEX: 36.58 KG/M2 | DIASTOLIC BLOOD PRESSURE: 76 MMHG | TEMPERATURE: 98.2 F

## 2019-04-12 DIAGNOSIS — S39.012A LUMBOSACRAL STRAIN, INITIAL ENCOUNTER: Primary | ICD-10-CM

## 2019-04-12 PROCEDURE — 96372 THER/PROPH/DIAG INJ SC/IM: CPT

## 2019-04-12 PROCEDURE — 6360000002 HC RX W HCPCS: Performed by: PHYSICIAN ASSISTANT

## 2019-04-12 PROCEDURE — 99212 OFFICE O/P EST SF 10 MIN: CPT

## 2019-04-12 RX ORDER — KETOROLAC TROMETHAMINE 30 MG/ML
60 INJECTION, SOLUTION INTRAMUSCULAR; INTRAVENOUS ONCE
Status: COMPLETED | OUTPATIENT
Start: 2019-04-12 | End: 2019-04-12

## 2019-04-12 RX ORDER — NAPROXEN 500 MG/1
500 TABLET ORAL 2 TIMES DAILY
Qty: 14 TABLET | Refills: 0 | Status: SHIPPED | OUTPATIENT
Start: 2019-04-12 | End: 2020-09-17 | Stop reason: ALTCHOICE

## 2019-04-12 RX ORDER — CYCLOBENZAPRINE HCL 10 MG
10 TABLET ORAL 3 TIMES DAILY PRN
Qty: 12 TABLET | Refills: 0 | Status: SHIPPED | OUTPATIENT
Start: 2019-04-12 | End: 2019-04-16

## 2019-04-12 RX ADMIN — KETOROLAC TROMETHAMINE 60 MG: 30 INJECTION, SOLUTION INTRAMUSCULAR at 14:33

## 2019-04-12 ASSESSMENT — PAIN DESCRIPTION - FREQUENCY
FREQUENCY: CONTINUOUS
FREQUENCY: CONTINUOUS

## 2019-04-12 ASSESSMENT — PAIN DESCRIPTION - PROGRESSION
CLINICAL_PROGRESSION: GRADUALLY WORSENING
CLINICAL_PROGRESSION: GRADUALLY IMPROVING

## 2019-04-12 ASSESSMENT — PAIN DESCRIPTION - LOCATION
LOCATION: BACK
LOCATION: BACK

## 2019-04-12 ASSESSMENT — PAIN SCALES - GENERAL
PAINLEVEL_OUTOF10: 6
PAINLEVEL_OUTOF10: 10
PAINLEVEL_OUTOF10: 10

## 2019-04-12 ASSESSMENT — PAIN DESCRIPTION - ORIENTATION
ORIENTATION: LOWER
ORIENTATION: LOWER

## 2019-04-12 ASSESSMENT — PAIN DESCRIPTION - DESCRIPTORS: DESCRIPTORS: ACHING;DISCOMFORT;SPASM;TIGHTNESS

## 2019-04-12 ASSESSMENT — PAIN DESCRIPTION - PAIN TYPE
TYPE: ACUTE PAIN
TYPE: ACUTE PAIN

## 2019-04-12 NOTE — ED PROVIDER NOTES
Department of Emergency Medicine  [address] [place] [address]  Provider Note  Admit Date/Time: 4/12/2019  1:52 PM  Room: 06/06  MRN: [id number]  Chief Complaint: Back Pain (c/o lower back pain and tightness that started this am after reaching for her phone)       History of Present Illness   Source of history provided by:  Patient. History/Exam Limitations: None. Alia Mcgovern is a 48 y.o. female day history of NIDDM. She reports that she leaned over to  her cell phone this morning and felt pain in the right lower back. It's been constant since. Patient denies any paresthesias, weakness, or radiculopathy in the lower extremities. Denies any fevers, chills, nausea, vomiting, abdominal pain, urinary symptoms or hematuria. Denies any cauda equina symptoms. Is not anticoagulated. Is not an IV drug user. Denies any history of malignancy. No recent spinal instrumentation. Pain is provoked by movement and especially by standing upright and palliated by remaining still. Denies any trauma. ROS    Pertinent positives and negatives are stated within HPI, all other systems reviewed and are negative. Past Surgical History:   Procedure Laterality Date    DILATION AND CURETTAGE OF UTERUS      HYSTERECTOMY  Jun 2013    Robotic Assisted Total Hysterectomy w/BSO, Cystoscopy    LITHOTRIPSY      AR CYSTO/URETERO W/LITHOTRIPSY &INDWELL STENT INSRT Bilateral 7/7/2018    CYSTOSCOPY RETROGRADE [facility]ELO[facility]/2018    BILATERAL CYSTOSCOPY RETROGRADE PYELOGRAM, URETEROSCOPY, LASER LITHOTRIPSY WITH BILATERAL STENT CHANGE performed by French Brand MD at [facility]     Social History:  reports that she has quit smoking. She quit after 0.50 years of use. She has never used smokeless tobacco. She reports that she does not drink alcohol or use drugs.   Family History: family history is not on file. Allergies: Patient has no known allergies. Physical Exam     Physical Exam    Gen. : Vitals noted no distress. Afebrile. Heart: Regular rate rhythm no murmur. Lungs: Clear to auscultation bilaterally with good aeration and no adventitious breath sounds. Abdomen: Soft, nontender, nonsurgical throughout. Normoactive bowel sounds. No pulsatile masses or abdominal bruits to auscultation. Back: There is tenderness to palpation in the right paraspinal musculature of the LS. No midline tenderness. Normal motor sensory, symmetric reflexes, strong equal peripheral pulses, normal Babinski's bilaterally. Skin: No rash. Neuro: No focal neurologic deficits, NIH score of 0. Lab / Imaging Results   (All laboratory and radiology results have been personally reviewed by myself)  Labs:  No results found for this visit on 04/12/19. Imaging: All Radiology results interpreted by Radiologist unless otherwise noted. No orders to display       ED Course / Medical Decision Making     Medications   ketorolac (TORADOL) injection 60 mg (has no administration in time range)          Consult(s):   None    Procedure(s):   None. Differential Diagnosis: Is extensive but includes myofascial/mechanical etiology, exacerbation of chronic back pain, fracture, subluxation, herniated nucleus pulposus, epidural abscess/hematoma, vertebral osteomyelitis, discitis, cauda equina syndrome, AAA, retroperitoneal hemorrhage, intra-abdominal pathology, metastatic disease, etc.    MDM:  This is a 48 y.o. female who reached over to  her cell phone this morning and felt pain in the right lower back that has been persistent since. No radiculopathy with this. On exam, does have reproducible tenderness in the right paraspinal musculature of the LS but is neurovascularly intact in the lower extremities bilaterally. Agrees that imaging is not indicated. Will be home-going with NSAIDs and Flexeril.  She does note an NOTE          Boone Parham [address] [place], [place]  04/12/19 1102

## 2019-10-31 ENCOUNTER — HOSPITAL ENCOUNTER (OUTPATIENT)
Dept: ULTRASOUND IMAGING | Age: 54
Discharge: HOME | End: 2019-10-31
Payer: COMMERCIAL

## 2019-10-31 DIAGNOSIS — K58.0 IRRITABLE BOWEL SYNDROME WITH DIARRHEA: ICD-10-CM

## 2019-10-31 PROCEDURE — 76705 ECHO EXAM OF ABDOMEN: CPT

## 2020-09-17 ENCOUNTER — HOSPITAL ENCOUNTER (EMERGENCY)
Age: 55
Discharge: HOME | End: 2020-09-17
Attending: EMERGENCY MEDICINE
Payer: COMMERCIAL

## 2020-09-17 ENCOUNTER — APPOINTMENT (OUTPATIENT)
Dept: CT IMAGING | Age: 55
End: 2020-09-17
Payer: COMMERCIAL

## 2020-09-17 VITALS
SYSTOLIC BLOOD PRESSURE: 145 MMHG | HEART RATE: 78 BPM | OXYGEN SATURATION: 95 % | DIASTOLIC BLOOD PRESSURE: 73 MMHG | RESPIRATION RATE: 18 BRPM | WEIGHT: 200 LBS | TEMPERATURE: 97.4 F | HEIGHT: 62 IN | BODY MASS INDEX: 36.8 KG/M2

## 2020-09-17 LAB
ANION GAP SERPL CALCULATED.3IONS-SCNC: 14 MMOL/L (ref 7–16)
BACTERIA: ABNORMAL /HPF
BASOPHILS ABSOLUTE: 0.03 E9/L (ref 0–0.2)
BASOPHILS RELATIVE PERCENT: 0.3 % (ref 0–2)
BILIRUBIN URINE: NEGATIVE
BLOOD, URINE: ABNORMAL
BUN BLDV-MCNC: 26 MG/DL (ref 6–20)
CALCIUM SERPL-MCNC: 9.9 MG/DL (ref 8.6–10.2)
CHLORIDE BLD-SCNC: 100 MMOL/L (ref 98–107)
CLARITY: ABNORMAL
CO2: 25 MMOL/L (ref 22–29)
COLOR: YELLOW
CREAT SERPL-MCNC: 1.4 MG/DL (ref 0.5–1)
EOSINOPHILS ABSOLUTE: 0.04 E9/L (ref 0.05–0.5)
EOSINOPHILS RELATIVE PERCENT: 0.4 % (ref 0–6)
GFR AFRICAN AMERICAN: 48
GFR NON-AFRICAN AMERICAN: 39 ML/MIN/1.73
GLUCOSE BLD-MCNC: 205 MG/DL (ref 74–99)
GLUCOSE URINE: 100 MG/DL
HCG, URINE, POC: NEGATIVE
HCT VFR BLD CALC: 34.9 % (ref 34–48)
HEMOGLOBIN: 11.4 G/DL (ref 11.5–15.5)
IMMATURE GRANULOCYTES #: 0.04 E9/L
IMMATURE GRANULOCYTES %: 0.4 % (ref 0–5)
KETONES, URINE: NEGATIVE MG/DL
LEUKOCYTE ESTERASE, URINE: ABNORMAL
LYMPHOCYTES ABSOLUTE: 0.99 E9/L (ref 1.5–4)
LYMPHOCYTES RELATIVE PERCENT: 10.7 % (ref 20–42)
Lab: NORMAL
MCH RBC QN AUTO: 28.1 PG (ref 26–35)
MCHC RBC AUTO-ENTMCNC: 32.7 % (ref 32–34.5)
MCV RBC AUTO: 86 FL (ref 80–99.9)
MONOCYTES ABSOLUTE: 0.4 E9/L (ref 0.1–0.95)
MONOCYTES RELATIVE PERCENT: 4.3 % (ref 2–12)
NEGATIVE QC PASS/FAIL: NORMAL
NEUTROPHILS ABSOLUTE: 7.73 E9/L (ref 1.8–7.3)
NEUTROPHILS RELATIVE PERCENT: 83.9 % (ref 43–80)
NITRITE, URINE: NEGATIVE
PDW BLD-RTO: 13.6 FL (ref 11.5–15)
PH UA: 5.5 (ref 5–9)
PLATELET # BLD: 215 E9/L (ref 130–450)
PMV BLD AUTO: 10 FL (ref 7–12)
POSITIVE QC PASS/FAIL: NORMAL
POTASSIUM REFLEX MAGNESIUM: 4.6 MMOL/L (ref 3.5–5)
PROTEIN UA: ABNORMAL MG/DL
RBC # BLD: 4.06 E12/L (ref 3.5–5.5)
RBC UA: ABNORMAL /HPF (ref 0–2)
SODIUM BLD-SCNC: 139 MMOL/L (ref 132–146)
SPECIFIC GRAVITY UA: >=1.03 (ref 1–1.03)
UROBILINOGEN, URINE: 0.2 E.U./DL
WBC # BLD: 9.2 E9/L (ref 4.5–11.5)
WBC UA: ABNORMAL /HPF (ref 0–5)

## 2020-09-17 PROCEDURE — 85025 COMPLETE CBC W/AUTO DIFF WBC: CPT

## 2020-09-17 PROCEDURE — 96374 THER/PROPH/DIAG INJ IV PUSH: CPT

## 2020-09-17 PROCEDURE — 81001 URINALYSIS AUTO W/SCOPE: CPT

## 2020-09-17 PROCEDURE — 2580000003 HC RX 258: Performed by: EMERGENCY MEDICINE

## 2020-09-17 PROCEDURE — 96375 TX/PRO/DX INJ NEW DRUG ADDON: CPT

## 2020-09-17 PROCEDURE — 74176 CT ABD & PELVIS W/O CONTRAST: CPT

## 2020-09-17 PROCEDURE — 99283 EMERGENCY DEPT VISIT LOW MDM: CPT

## 2020-09-17 PROCEDURE — 6360000002 HC RX W HCPCS: Performed by: EMERGENCY MEDICINE

## 2020-09-17 PROCEDURE — 80048 BASIC METABOLIC PNL TOTAL CA: CPT

## 2020-09-17 RX ORDER — POTASSIUM CITRATE 10 MEQ/1
10 TABLET, EXTENDED RELEASE ORAL
COMMUNITY

## 2020-09-17 RX ORDER — KETOROLAC TROMETHAMINE 30 MG/ML
30 INJECTION, SOLUTION INTRAMUSCULAR; INTRAVENOUS ONCE
Status: COMPLETED | OUTPATIENT
Start: 2020-09-17 | End: 2020-09-17

## 2020-09-17 RX ORDER — KETOROLAC TROMETHAMINE 10 MG/1
10 TABLET, FILM COATED ORAL EVERY 6 HOURS PRN
Qty: 20 TABLET | Refills: 0 | Status: SHIPPED | OUTPATIENT
Start: 2020-09-17

## 2020-09-17 RX ORDER — CEFDINIR 300 MG/1
300 CAPSULE ORAL 2 TIMES DAILY
Qty: 20 CAPSULE | Refills: 0 | Status: SHIPPED | OUTPATIENT
Start: 2020-09-17 | End: 2020-09-27

## 2020-09-17 RX ORDER — 0.9 % SODIUM CHLORIDE 0.9 %
1000 INTRAVENOUS SOLUTION INTRAVENOUS ONCE
Status: COMPLETED | OUTPATIENT
Start: 2020-09-17 | End: 2020-09-17

## 2020-09-17 RX ORDER — ONDANSETRON 2 MG/ML
4 INJECTION INTRAMUSCULAR; INTRAVENOUS ONCE
Status: COMPLETED | OUTPATIENT
Start: 2020-09-17 | End: 2020-09-17

## 2020-09-17 RX ORDER — LOSARTAN POTASSIUM 100 MG/1
100 TABLET ORAL DAILY
COMMUNITY

## 2020-09-17 RX ORDER — ONDANSETRON 4 MG/1
4 TABLET, ORALLY DISINTEGRATING ORAL EVERY 8 HOURS PRN
Qty: 10 TABLET | Refills: 0 | Status: SHIPPED | OUTPATIENT
Start: 2020-09-17 | End: 2021-09-17

## 2020-09-17 RX ORDER — TAMSULOSIN HYDROCHLORIDE 0.4 MG/1
0.4 CAPSULE ORAL DAILY
Qty: 10 CAPSULE | Refills: 0 | Status: SHIPPED | OUTPATIENT
Start: 2020-09-17 | End: 2020-10-01 | Stop reason: ALTCHOICE

## 2020-09-17 RX ADMIN — ONDANSETRON 4 MG: 2 INJECTION INTRAMUSCULAR; INTRAVENOUS at 09:03

## 2020-09-17 RX ADMIN — SODIUM CHLORIDE 1000 ML: 9 INJECTION, SOLUTION INTRAVENOUS at 09:03

## 2020-09-17 RX ADMIN — KETOROLAC TROMETHAMINE 30 MG: 30 INJECTION, SOLUTION INTRAMUSCULAR; INTRAVENOUS at 09:03

## 2020-09-17 ASSESSMENT — PAIN SCALES - GENERAL
PAINLEVEL_OUTOF10: 10
PAINLEVEL_OUTOF10: 8

## 2020-09-17 ASSESSMENT — ENCOUNTER SYMPTOMS
BLOOD IN STOOL: 0
DIARRHEA: 0
ABDOMINAL PAIN: 0
NAUSEA: 1
SHORTNESS OF BREATH: 0
COLOR CHANGE: 0
BACK PAIN: 1
ABDOMINAL DISTENTION: 0
VOMITING: 1

## 2020-09-17 NOTE — ED PROVIDER NOTES
Patient presents to the ED with a complaint of left flank pain. She reports this is similar pain to when she had a kidney stone 2 years ago. She states at that time she had bilateral obstructing stones and required hospitalization. States the pain started yesterday. States the pain is continuous. Describes as a \"hurting pain. \"  Has not associate anything to make her symptoms better or worse. She did try taking Excedrin last night which did not help. She does have associated nausea and vomiting. No blood in the urine. No blood in the stool. No fever or chills. Describes her symptoms as severe in severity. Review of Systems   Constitutional: Negative for chills, diaphoresis, fatigue and fever. Respiratory: Negative for shortness of breath. Cardiovascular: Negative for chest pain. Gastrointestinal: Positive for nausea and vomiting. Negative for abdominal distention, abdominal pain, blood in stool and diarrhea. Genitourinary: Positive for flank pain. Negative for decreased urine volume, difficulty urinating, dysuria, hematuria and urgency. Musculoskeletal: Positive for back pain. Skin: Negative for color change and pallor. Neurological: Negative for dizziness and light-headedness. Hematological: Does not bruise/bleed easily. All other systems reviewed and are negative. Physical Exam  Vitals signs and nursing note reviewed. Constitutional:       General: She is not in acute distress. Appearance: She is well-developed. She is obese. She is not diaphoretic. Comments: Patient appears uncomfortable but in no significant distress   HENT:      Head: Normocephalic and atraumatic. Eyes:      Pupils: Pupils are equal, round, and reactive to light. Neck:      Musculoskeletal: Normal range of motion and neck supple. Cardiovascular:      Rate and Rhythm: Normal rate and regular rhythm. Heart sounds: Normal heart sounds. No murmur.    Pulmonary:      Effort: Pulmonary effort is normal. No respiratory distress. Breath sounds: Normal breath sounds. No wheezing or rales. Abdominal:      General: Bowel sounds are normal. There is no distension. Palpations: Abdomen is soft. Tenderness: There is no abdominal tenderness. There is left CVA tenderness ( Mild CVA tenderness on the left). There is no right CVA tenderness, guarding or rebound. Skin:     General: Skin is warm and dry. Coloration: Skin is not pale. Neurological:      Mental Status: She is alert and oriented to person, place, and time. Procedures     MDM   Patient presents to the ED for evaluation of left flank pain. Differential diagnoses included but not limited to musculoskeletal pain, kidney stone, pyelonephritis. Workup in the ED revealed unremarkable labs including CBC and BMP. Mild renal insufficiency. CT does show a obstructing renal stone on the left. Urinalysis was also infected. This may be secondary to the kidney stone but I will place her on antibiotics as well as nausea medicine and pain medicine. . Patient was given Toradol for their symptoms with good improvement. Patient continues to be non-toxic on re-evaluation. Findings were discussed with the patient and reasons to immediately return to the ED were articulated to them. They will follow-up with their PMD and urology. --------------------------------------------- PAST HISTORY ---------------------------------------------  Past Medical History:  has a past medical history of Acute renal failure (ARF) ([facility] [address] Diabetes mellitus ([facility] [address] Fibroid, uterine, GERD (gastroesophageal reflux disease), Hypertension, Kidney stone, and Thyroid disease. Past Surgical History:  has a past surgical history that includes Dilation and curettage of uterus; Lithotripsy; Tonsillectomy;  Hysterectomy (Jun 2013); pr cysto/uretero w/lithotripsy &indwell stent insrt (Bilateral, 7/7/2018); and pr cysto/uretero/pyeloscopy w/lithotripsy (Bilateral, 7/25/2018). Social History:  reports that she has quit smoking. She quit after 0.50 years of use. She has never used smokeless tobacco. She reports that she does not drink alcohol or use drugs. Family History: family history is not on file. The patients home medications have been reviewed. Allergies: Patient has no known allergies.     -------------------------------------------------- RESULTS -------------------------------------------------  Labs:  Results for orders placed or performed during the hospital encounter of 09/17/20   CBC Auto Differential   Result Value Ref Range    WBC 9.2 4.5 - 11.5 E9/L    RBC 4.06 3.50 - 5.50 E12/L    Hemoglobin 11.4 (L) 11.5 - 15.5 g/dL    Hematocrit 34.9 34.0 - 48.0 %    MCV 86.0 80.0 - 99.9 fL    MCH 28.1 26.0 - 35.0 pg    MCHC 32.7 32.0 - 34.5 %    RDW 13.6 11.5 - 15.0 fL    Platelets 135 990 - 052 E9/L    MPV 10.0 7.0 - 12.0 fL    Neutrophils % 83.9 (H) 43.0 - 80.0 %    Immature Granulocytes % 0.4 0.0 - 5.0 %    Lymphocytes % 10.7 (L) 20.0 - 42.0 %    Monocytes % 4.3 2.0 - 12.0 %    Eosinophils % 0.4 0.0 - 6.0 %    Basophils % 0.3 0.0 - 2.0 %    Neutrophils Absolute 7.73 (H) 1.80 - 7.30 E9/L    Immature Granulocytes # 0.04 E9/L    Lymphocytes Absolute 0.99 (L) 1.50 - 4.00 E9/L    Monocytes Absolute 0.40 0.10 - 0.95 E9/L    Eosinophils Absolute 0.04 (L) 0.05 - 0.50 E9/L    Basophils Absolute 0.03 0.00 - 0.20 E2/J   Basic Metabolic Panel w/ Reflex to MG   Result Value Ref Range    Sodium 139 132 - 146 mmol/L    Potassium reflex Magnesium 4.6 3.5 - 5.0 mmol/L    Chloride 100 98 - 107 mmol/L    CO2 25 22 - 29 mmol/L    Anion Gap 14 7 - 16 mmol/L    Glucose 205 (H) 74 - 99 mg/dL    BUN 26 (H) 6 - 20 mg/dL    CREATININE 1.4 (H) 0.5 - 1.0 mg/dL    GFR Non-African American 39 >=60 mL/min/1.73    GFR African American 48     Calcium 9.9 8.6 - 10.2 mg/dL   Urinalysis, reflex to microscopic   Result Value Ref Range    Color, UA Yellow Straw/Yellow    Clarity, UA They will followup with primary care by calling their office tomorrow. --------------------------------- ADDITIONAL PROVIDER NOTES ---------------------------------  At this time the patient is without objective evidence of an acute process requiring hospitalization or inpatient management. They have remained hemodynamically stable throughout their entire ED visit and are stable for discharge with outpatient follow-up. The plan has been discussed in detail and they are aware of the specific conditions for emergent return, as well as the importance of follow-up. New Prescriptions    CEFDINIR (OMNICEF) 300 MG CAPSULE    Take 1 capsule by mouth 2 times daily for 10 days    KETOROLAC (TORADOL) 10 MG TABLET    Take 1 tablet by mouth every 6 hours as needed for Pain First dose given in the ED. ONDANSETRON (ZOFRAN ODT) 4 MG DISINTEGRATING TABLET    Take 1 tablet by mouth every 8 hours as needed for Nausea or Vomiting    TAMSULOSIN (FLOMAX) 0.4 MG CAPSULE    Take 1 capsule by mouth daily       Diagnosis:  1. Kidney stone    2. Urinary tract infection with hematuria, site unspecified        Disposition:  Patient's disposition: Discharge to home  Patient's condition is stable.                Alex Molina DO  09/17/20 1010

## 2020-09-25 ENCOUNTER — HOSPITAL ENCOUNTER (OUTPATIENT)
Age: 55
Discharge: HOME | End: 2020-09-27
Payer: COMMERCIAL

## 2020-09-25 ENCOUNTER — HOSPITAL ENCOUNTER (OUTPATIENT)
Dept: GENERAL RADIOLOGY | Age: 55
Discharge: HOME | End: 2020-09-27
Payer: COMMERCIAL

## 2020-09-25 PROCEDURE — 74018 RADEX ABDOMEN 1 VIEW: CPT

## 2020-09-25 PROCEDURE — 82365 CALCULUS SPECTROSCOPY: CPT

## 2020-09-25 PROCEDURE — 88300 SURGICAL PATH GROSS: CPT

## 2020-10-01 RX ORDER — LORATADINE 10 MG/1
10 CAPSULE, LIQUID FILLED ORAL NIGHTLY
COMMUNITY

## 2020-10-01 NOTE — PROGRESS NOTES
[facility] 38 PRE-ADMISSION TESTING GENERAL INSTRUCTIONS- [facility]-phone number:[phone]    GENERAL INSTRUCTIONS  [x] Antibacterial Soap shower Night before and/or AM of Surgery  [] Larry wipe instruction sheet and wipes given. [x] Nothing by mouth after midnight, including gum, candy, mints, or water. [x] You may brush your teeth, gargle, but do NOT swallow water. []Hibiclens shower  the night before and the morning of surgery. Do not use             Hibiclens on your face or head. []No smoking, chewing tobacco, illegal drugs, or alcohol within 24 hours of your surgery. [x] Jewelry, valuables or body piercing's should not be brought to the hospital. All body and/or tongue piercing's must be removed prior to arriving to hospital.  ALL hair pins must be removed. [x] Do not wear makeup, lotions, powders, deodorant. Nail polish as directed by the nurse. [x] Arrange transportation with a responsible adult  to and from the hospital. If you do not have a responsible adult  to transport you, you will need to make arrangements with a medical transportation company (i.e. [employer]. A Uber/taxi/bus is not appropriate unless you are accompanied by a responsible adult ). Arrange for someone to be with you for the remainder of the day and for 24 hours after your procedure due to having had anesthesia. Who will be your  for transportation? __parent_______________   Who will be staying with you for 24 hrs after your procedure? __spouse________________  [x] Bring insurance card and photo ID.  [] Transfusion Bracelet: Please bring with you to hospital, day of surgery  [] Bring urine specimen day of surgery. Any small container is acceptable. [] Use inhalers the morning of surgery and bring with you to hospital.  [] Bring copy of living will or healthcare power of  papers to be placed in your electronic record.   [] CPAP/BI-PAP: Please bring your machine if you are to spend the night in the hospital.     PARKING INSTRUCTIONS:   [x] Arrival Time:_ 1130, enter the main entrance, park in parking garage. One person may accompany you. Wear a mask. [x] To reach the [facility] from [facility], upon entering the hospital, take elevator B to the 3rd floor. Check in with the .  EDUCATION INSTRUCTIONS:      [] Knee or hip replacement booklet & exercise pamphlets given. [] Aditya 77 placed in chart. [] Pre-admission Testing educational folder given  [] Incentive Spirometry,coughing & deep breathing exercises reviewed. []Medication information sheet(s)   [x]Fluoroscopy-Xray used in surgery reviewed with patient. Educational pamphlet placed in chart. [x]Pain: Post-op pain is normal and to be expected. You will be asked to rate your pain from 0-10(a zero is not acceptable-education is needed). Your post-op pain goal is:5  [x] Ask your nurse for your pain medication. [] Joint camp offered. [] Joint replacement booklets given. [x] Other:_burning with urination is normal, may have hematuria.__________________________    MEDICATION INSTRUCTIONS:   [x]Bring a complete list of your medications, please write the last time you took the medicine, give this list to the nurse. [x] Take the following medications the morning of surgery with 1-2 ounces of water: see list  [] Stop herbal supplements and vitamins 5 days before your surgery. [x] DO NOT take any diabetic medicine the morning of surgery. Follow instructions for insulin the day before surgery. [] If you are diabetic and your blood sugar is low or you feel symptomatic, you may drink 1-2 ounces of apple juice or take a glucose tablet. The morning of your procedure, you may call the pre-op area if you have concerns about your blood sugar [phone]. [] Use your inhalers the morning of surgery. Bring your emergency inhaler with you day of surgery.    [] Follow physician instructions regarding any blood thinners you may be taking. WHAT TO EXPECT:  [] The day of surgery you will be greeted and checked in by the [employer].  In addition, you will be registered in the [facility] by a Patient Access Representative. Please bring your photo ID and insurance card. A nurse will greet you in accordance to the time you are needed in the pre-op area to prepare you for surgery. Please do not be discouraged if you are not greeted in the order you arrive as there are many variables that are involved in patient preparation. Your patience is greatly appreciated as you wait for your nurse. Please bring in items such as: books, magazines, newspapers, electronics, or any other items  to occupy your time in the waiting area. [x]  Delays may occur with surgery and staff will make a sincere effort to keep you informed of delays. If any delays occur with your procedure, we apologize ahead of time for your inconvenience as we recognize the value of your time.

## 2020-10-01 NOTE — PROGRESS NOTES
Patient agreed to covid test 10/2 at [facility] [place] between 0635-4805. Instructed to self quarantine. States she has to work. Encourage to follow proper mask protocol and hand washing. Avoid unnecessary events till [place].

## 2020-10-02 ENCOUNTER — HOSPITAL ENCOUNTER (OUTPATIENT)
Age: 55
Discharge: HOME | End: 2020-10-04
Payer: COMMERCIAL

## 2020-10-02 LAB
CALCULI COMPOSITION: NORMAL
MASS: 135 MG
STONE DESCRIPTION: NORMAL
STONE NUMBER: 1
STONE SIZE: NORMAL MM

## 2020-10-02 PROCEDURE — U0003 INFECTIOUS AGENT DETECTION BY NUCLEIC ACID (DNA OR RNA); SEVERE ACUTE RESPIRATORY SYNDROME CORONAVIRUS 2 (SARS-COV-2) (CORONAVIRUS DISEASE [COVID-19]), AMPLIFIED PROBE TECHNIQUE, MAKING USE OF HIGH THROUGHPUT TECHNOLOGIES AS DESCRIBED BY CMS-2020-01-R: HCPCS

## 2020-10-03 ENCOUNTER — PREP FOR PROCEDURE (OUTPATIENT)
Dept: UROLOGY | Age: 55
End: 2020-10-03

## 2020-10-04 LAB
SARS-COV-2: NOT DETECTED
SOURCE: NORMAL

## 2020-10-04 RX ORDER — SODIUM CHLORIDE 9 MG/ML
INJECTION, SOLUTION INTRAVENOUS CONTINUOUS
Status: CANCELLED | OUTPATIENT
Start: 2020-10-04

## 2020-10-07 ENCOUNTER — ANESTHESIA EVENT (OUTPATIENT)
Dept: OPERATING ROOM | Age: 55
End: 2020-10-07
Payer: COMMERCIAL

## 2020-10-08 ENCOUNTER — ANESTHESIA (OUTPATIENT)
Dept: OPERATING ROOM | Age: 55
End: 2020-10-08
Payer: COMMERCIAL

## 2020-10-08 ENCOUNTER — HOSPITAL ENCOUNTER (OUTPATIENT)
Age: 55
Setting detail: OUTPATIENT SURGERY
Discharge: HOME | End: 2020-10-08
Attending: UROLOGY | Admitting: UROLOGY
Payer: COMMERCIAL

## 2020-10-08 VITALS
HEART RATE: 98 BPM | WEIGHT: 200 LBS | OXYGEN SATURATION: 96 % | SYSTOLIC BLOOD PRESSURE: 174 MMHG | DIASTOLIC BLOOD PRESSURE: 102 MMHG | HEIGHT: 62 IN | RESPIRATION RATE: 18 BRPM | BODY MASS INDEX: 36.8 KG/M2 | TEMPERATURE: 97.5 F

## 2020-10-08 VITALS — DIASTOLIC BLOOD PRESSURE: 54 MMHG | OXYGEN SATURATION: 98 % | SYSTOLIC BLOOD PRESSURE: 102 MMHG | TEMPERATURE: 96.8 F

## 2020-10-08 LAB
ANION GAP SERPL CALCULATED.3IONS-SCNC: 15 MMOL/L (ref 7–16)
BUN BLDV-MCNC: 24 MG/DL (ref 6–20)
CALCIUM SERPL-MCNC: 10.4 MG/DL (ref 8.6–10.2)
CHLORIDE BLD-SCNC: 100 MMOL/L (ref 98–107)
CO2: 24 MMOL/L (ref 22–29)
CREAT SERPL-MCNC: 1.2 MG/DL (ref 0.5–1)
EKG ATRIAL RATE: 100 BPM
EKG P AXIS: 45 DEGREES
EKG P-R INTERVAL: 144 MS
EKG Q-T INTERVAL: 330 MS
EKG QRS DURATION: 78 MS
EKG QTC CALCULATION (BAZETT): 425 MS
EKG R AXIS: -11 DEGREES
EKG T AXIS: 7 DEGREES
EKG VENTRICULAR RATE: 100 BPM
GFR AFRICAN AMERICAN: 57
GFR NON-AFRICAN AMERICAN: 47 ML/MIN/1.73
GLUCOSE BLD-MCNC: 184 MG/DL (ref 74–99)
HCT VFR BLD CALC: 34 % (ref 34–48)
HEMOGLOBIN: 11 G/DL (ref 11.5–15.5)
MCH RBC QN AUTO: 27.8 PG (ref 26–35)
MCHC RBC AUTO-ENTMCNC: 32.4 % (ref 32–34.5)
MCV RBC AUTO: 86.1 FL (ref 80–99.9)
METER GLUCOSE: 180 MG/DL (ref 74–99)
PDW BLD-RTO: 14.1 FL (ref 11.5–15)
PLATELET # BLD: 196 E9/L (ref 130–450)
PMV BLD AUTO: 10.2 FL (ref 7–12)
POTASSIUM SERPL-SCNC: 4.9 MMOL/L (ref 3.5–5)
RBC # BLD: 3.95 E12/L (ref 3.5–5.5)
SODIUM BLD-SCNC: 139 MMOL/L (ref 132–146)
WBC # BLD: 5.3 E9/L (ref 4.5–11.5)

## 2020-10-08 PROCEDURE — 3700000001 HC ADD 15 MINUTES (ANESTHESIA): Performed by: UROLOGY

## 2020-10-08 PROCEDURE — 7100000000 HC PACU RECOVERY - FIRST 15 MIN: Performed by: UROLOGY

## 2020-10-08 PROCEDURE — 7100000010 HC PHASE II RECOVERY - FIRST 15 MIN: Performed by: UROLOGY

## 2020-10-08 PROCEDURE — 3600000013 HC SURGERY LEVEL 3 ADDTL 15MIN: Performed by: UROLOGY

## 2020-10-08 PROCEDURE — 6360000002 HC RX W HCPCS

## 2020-10-08 PROCEDURE — 80048 BASIC METABOLIC PNL TOTAL CA: CPT

## 2020-10-08 PROCEDURE — 93010 ELECTROCARDIOGRAM REPORT: CPT | Performed by: INTERNAL MEDICINE

## 2020-10-08 PROCEDURE — 7100000011 HC PHASE II RECOVERY - ADDTL 15 MIN: Performed by: UROLOGY

## 2020-10-08 PROCEDURE — 93005 ELECTROCARDIOGRAM TRACING: CPT | Performed by: NURSE PRACTITIONER

## 2020-10-08 PROCEDURE — 85027 COMPLETE CBC AUTOMATED: CPT

## 2020-10-08 PROCEDURE — 2580000003 HC RX 258: Performed by: NURSE PRACTITIONER

## 2020-10-08 PROCEDURE — 7100000001 HC PACU RECOVERY - ADDTL 15 MIN: Performed by: UROLOGY

## 2020-10-08 PROCEDURE — 6360000002 HC RX W HCPCS: Performed by: NURSE PRACTITIONER

## 2020-10-08 PROCEDURE — 3600000003 HC SURGERY LEVEL 3 BASE: Performed by: UROLOGY

## 2020-10-08 PROCEDURE — C2617 STENT, NON-COR, TEM W/O DEL: HCPCS | Performed by: UROLOGY

## 2020-10-08 PROCEDURE — 36415 COLL VENOUS BLD VENIPUNCTURE: CPT

## 2020-10-08 PROCEDURE — 82962 GLUCOSE BLOOD TEST: CPT

## 2020-10-08 PROCEDURE — 3700000000 HC ANESTHESIA ATTENDED CARE: Performed by: UROLOGY

## 2020-10-08 DEVICE — URETERAL STENT
Type: IMPLANTABLE DEVICE | Site: BLADDER | Status: FUNCTIONAL
Brand: PERCUFLEX™

## 2020-10-08 RX ORDER — ONDANSETRON 2 MG/ML
INJECTION INTRAMUSCULAR; INTRAVENOUS PRN
Status: DISCONTINUED | OUTPATIENT
Start: 2020-10-08 | End: 2020-10-08 | Stop reason: SDUPTHER

## 2020-10-08 RX ORDER — LIDOCAINE HYDROCHLORIDE 20 MG/ML
INJECTION, SOLUTION INTRAVENOUS PRN
Status: DISCONTINUED | OUTPATIENT
Start: 2020-10-08 | End: 2020-10-08 | Stop reason: SDUPTHER

## 2020-10-08 RX ORDER — PHENAZOPYRIDINE HYDROCHLORIDE 100 MG/1
100 TABLET, FILM COATED ORAL 3 TIMES DAILY PRN
Status: DISCONTINUED | OUTPATIENT
Start: 2020-10-08 | End: 2020-10-08 | Stop reason: HOSPADM

## 2020-10-08 RX ORDER — MORPHINE SULFATE 2 MG/ML
2 INJECTION, SOLUTION INTRAMUSCULAR; INTRAVENOUS EVERY 5 MIN PRN
Status: DISCONTINUED | OUTPATIENT
Start: 2020-10-08 | End: 2020-10-08 | Stop reason: HOSPADM

## 2020-10-08 RX ORDER — PROPOFOL 10 MG/ML
INJECTION, EMULSION INTRAVENOUS PRN
Status: DISCONTINUED | OUTPATIENT
Start: 2020-10-08 | End: 2020-10-08 | Stop reason: SDUPTHER

## 2020-10-08 RX ORDER — OXYCODONE HYDROCHLORIDE AND ACETAMINOPHEN 5; 325 MG/1; MG/1
1 TABLET ORAL PRN
Status: DISCONTINUED | OUTPATIENT
Start: 2020-10-08 | End: 2020-10-08 | Stop reason: HOSPADM

## 2020-10-08 RX ORDER — OXYCODONE HYDROCHLORIDE AND ACETAMINOPHEN 5; 325 MG/1; MG/1
1 TABLET ORAL EVERY 4 HOURS PRN
Qty: 10 TABLET | Refills: 0 | Status: SHIPPED | OUTPATIENT
Start: 2020-10-08 | End: 2020-10-11

## 2020-10-08 RX ORDER — ONDANSETRON 2 MG/ML
4 INJECTION INTRAMUSCULAR; INTRAVENOUS EVERY 6 HOURS PRN
Status: DISCONTINUED | OUTPATIENT
Start: 2020-10-08 | End: 2020-10-08 | Stop reason: HOSPADM

## 2020-10-08 RX ORDER — DEXAMETHASONE SODIUM PHOSPHATE 10 MG/ML
INJECTION, SOLUTION INTRAMUSCULAR; INTRAVENOUS PRN
Status: DISCONTINUED | OUTPATIENT
Start: 2020-10-08 | End: 2020-10-08 | Stop reason: SDUPTHER

## 2020-10-08 RX ORDER — OXYCODONE HYDROCHLORIDE AND ACETAMINOPHEN 5; 325 MG/1; MG/1
2 TABLET ORAL PRN
Status: DISCONTINUED | OUTPATIENT
Start: 2020-10-08 | End: 2020-10-08 | Stop reason: HOSPADM

## 2020-10-08 RX ORDER — MORPHINE SULFATE 2 MG/ML
1 INJECTION, SOLUTION INTRAMUSCULAR; INTRAVENOUS EVERY 5 MIN PRN
Status: DISCONTINUED | OUTPATIENT
Start: 2020-10-08 | End: 2020-10-08 | Stop reason: HOSPADM

## 2020-10-08 RX ORDER — ONDANSETRON 2 MG/ML
4 INJECTION INTRAMUSCULAR; INTRAVENOUS
Status: DISCONTINUED | OUTPATIENT
Start: 2020-10-08 | End: 2020-10-08 | Stop reason: HOSPADM

## 2020-10-08 RX ORDER — IBUPROFEN 400 MG/1
600 TABLET ORAL EVERY 8 HOURS PRN
Status: DISCONTINUED | OUTPATIENT
Start: 2020-10-08 | End: 2020-10-08 | Stop reason: HOSPADM

## 2020-10-08 RX ORDER — OXYCODONE HYDROCHLORIDE AND ACETAMINOPHEN 5; 325 MG/1; MG/1
1 TABLET ORAL EVERY 4 HOURS PRN
Status: DISCONTINUED | OUTPATIENT
Start: 2020-10-08 | End: 2020-10-08 | Stop reason: HOSPADM

## 2020-10-08 RX ORDER — IBUPROFEN 600 MG/1
600 TABLET ORAL EVERY 8 HOURS PRN
Qty: 20 TABLET | Refills: 0 | Status: SHIPPED | OUTPATIENT
Start: 2020-10-08

## 2020-10-08 RX ORDER — MIDAZOLAM HYDROCHLORIDE 1 MG/ML
INJECTION INTRAMUSCULAR; INTRAVENOUS PRN
Status: DISCONTINUED | OUTPATIENT
Start: 2020-10-08 | End: 2020-10-08 | Stop reason: SDUPTHER

## 2020-10-08 RX ORDER — OXYCODONE HYDROCHLORIDE AND ACETAMINOPHEN 5; 325 MG/1; MG/1
2 TABLET ORAL EVERY 4 HOURS PRN
Status: DISCONTINUED | OUTPATIENT
Start: 2020-10-08 | End: 2020-10-08 | Stop reason: HOSPADM

## 2020-10-08 RX ORDER — SODIUM CHLORIDE 9 MG/ML
INJECTION, SOLUTION INTRAVENOUS CONTINUOUS
Status: DISCONTINUED | OUTPATIENT
Start: 2020-10-08 | End: 2020-10-08 | Stop reason: HOSPADM

## 2020-10-08 RX ORDER — MEPERIDINE HYDROCHLORIDE 25 MG/ML
12.5 INJECTION INTRAMUSCULAR; INTRAVENOUS; SUBCUTANEOUS
Status: DISCONTINUED | OUTPATIENT
Start: 2020-10-08 | End: 2020-10-08 | Stop reason: HOSPADM

## 2020-10-08 RX ORDER — PHENAZOPYRIDINE HYDROCHLORIDE 100 MG/1
100 TABLET, FILM COATED ORAL 3 TIMES DAILY PRN
Qty: 30 TABLET | Refills: 0 | Status: SHIPPED | OUTPATIENT
Start: 2020-10-08 | End: 2020-10-18

## 2020-10-08 RX ORDER — FENTANYL CITRATE 50 UG/ML
INJECTION, SOLUTION INTRAMUSCULAR; INTRAVENOUS PRN
Status: DISCONTINUED | OUTPATIENT
Start: 2020-10-08 | End: 2020-10-08 | Stop reason: SDUPTHER

## 2020-10-08 RX ORDER — CIPROFLOXACIN 250 MG/1
250 TABLET, FILM COATED ORAL 2 TIMES DAILY
Qty: 6 TABLET | Refills: 0 | Status: SHIPPED | OUTPATIENT
Start: 2020-10-08 | End: 2020-10-11

## 2020-10-08 RX ADMIN — MIDAZOLAM 2 MG: 1 INJECTION INTRAMUSCULAR; INTRAVENOUS at 14:10

## 2020-10-08 RX ADMIN — FENTANYL CITRATE 100 MCG: 50 INJECTION, SOLUTION INTRAMUSCULAR; INTRAVENOUS at 14:39

## 2020-10-08 RX ADMIN — SODIUM CHLORIDE: 9 INJECTION, SOLUTION INTRAVENOUS at 14:36

## 2020-10-08 RX ADMIN — PROPOFOL 150 MG: 10 INJECTION, EMULSION INTRAVENOUS at 14:18

## 2020-10-08 RX ADMIN — PHENYLEPHRINE HYDROCHLORIDE 100 MCG: 10 INJECTION INTRAVENOUS at 14:36

## 2020-10-08 RX ADMIN — Medication 2 G: at 14:18

## 2020-10-08 RX ADMIN — DEXAMETHASONE SODIUM PHOSPHATE 10 MG: 10 INJECTION, SOLUTION INTRAMUSCULAR; INTRAVENOUS at 14:14

## 2020-10-08 RX ADMIN — SODIUM CHLORIDE: 9 INJECTION, SOLUTION INTRAVENOUS at 12:05

## 2020-10-08 RX ADMIN — ONDANSETRON HYDROCHLORIDE 4 MG: 2 INJECTION, SOLUTION INTRAMUSCULAR; INTRAVENOUS at 14:42

## 2020-10-08 RX ADMIN — PHENYLEPHRINE HYDROCHLORIDE 100 MCG: 10 INJECTION INTRAVENOUS at 14:41

## 2020-10-08 RX ADMIN — LIDOCAINE HYDROCHLORIDE 100 MG: 20 INJECTION, SOLUTION INTRAVENOUS at 14:14

## 2020-10-08 RX ADMIN — PROPOFOL 200 MG: 10 INJECTION, EMULSION INTRAVENOUS at 14:14

## 2020-10-08 RX ADMIN — FENTANYL CITRATE 100 MCG: 50 INJECTION, SOLUTION INTRAMUSCULAR; INTRAVENOUS at 14:14

## 2020-10-08 ASSESSMENT — PULMONARY FUNCTION TESTS
PIF_VALUE: 0
PIF_VALUE: 13
PIF_VALUE: 15
PIF_VALUE: 15
PIF_VALUE: 6
PIF_VALUE: 1
PIF_VALUE: 15
PIF_VALUE: 24
PIF_VALUE: 15
PIF_VALUE: 1
PIF_VALUE: 15
PIF_VALUE: 15
PIF_VALUE: 11
PIF_VALUE: 15
PIF_VALUE: 15
PIF_VALUE: 13
PIF_VALUE: 13
PIF_VALUE: 15
PIF_VALUE: 11
PIF_VALUE: 15
PIF_VALUE: 3
PIF_VALUE: 15
PIF_VALUE: 15
PIF_VALUE: 0
PIF_VALUE: 15
PIF_VALUE: 13
PIF_VALUE: 15
PIF_VALUE: 1
PIF_VALUE: 18
PIF_VALUE: 4
PIF_VALUE: 13
PIF_VALUE: 15
PIF_VALUE: 13
PIF_VALUE: 15
PIF_VALUE: 0
PIF_VALUE: 13
PIF_VALUE: 15
PIF_VALUE: 15
PIF_VALUE: 13
PIF_VALUE: 1
PIF_VALUE: 15
PIF_VALUE: 15
PIF_VALUE: 11
PIF_VALUE: 15
PIF_VALUE: 9
PIF_VALUE: 15
PIF_VALUE: 13
PIF_VALUE: 13
PIF_VALUE: 12
PIF_VALUE: 15
PIF_VALUE: 0
PIF_VALUE: 11
PIF_VALUE: 15
PIF_VALUE: 11
PIF_VALUE: 19

## 2020-10-08 ASSESSMENT — PAIN SCALES - GENERAL
PAINLEVEL_OUTOF10: 0

## 2020-10-08 ASSESSMENT — PAIN - FUNCTIONAL ASSESSMENT: PAIN_FUNCTIONAL_ASSESSMENT: 0-10

## 2020-10-08 ASSESSMENT — LIFESTYLE VARIABLES: SMOKING_STATUS: 0

## 2020-10-08 NOTE — ANESTHESIA POSTPROCEDURE EVALUATION
Department of Anesthesiology  Postprocedure Note    Patient: Merissa Motley  MRN: [id number]  YOB: 1965  Date of evaluation: 10/8/2020  Time:  4:13 PM     Procedure Summary     Date:  10/08/20 Room / Location:  [facility] OR [facility] / [facility]    Anesthesia Start:  1410 Anesthesia Stop:  8941    Procedure:  LEFT ESWL EXTRACORPOREAL SHOCK WAVE LITHOTRIPSY WITH CYSTO, LEFT STENT (Left Bladder) Diagnosis:  (RENAL STONE)    Surgeon:  Felice Newton MD Responsible Provider:  Martin Cunningham DO    Anesthesia Type:  general ASA Status:  3          Anesthesia Type: general    Rory Phase I: Rory Score: 10    Rory Phase II:      Last vitals: Reviewed and per EMR flowsheets.        Anesthesia Post Evaluation    Patient location during evaluation: bedside  Patient participation: complete - patient cannot participate  Level of consciousness: awake and alert  Airway patency: patent  Nausea & Vomiting: no nausea and no vomiting  Complications: no  Cardiovascular status: blood pressure returned to baseline  Respiratory status: acceptable  Hydration status: euvolemic

## 2020-10-08 NOTE — H&P
Follow-up for mixed urinary incontinence. HPI: Trinidad Beasley is a 50year-old female established patient who is here for mixed urinary incontinence. CC/HPI: 9/18/2020 = Here for 6 mm left upper ureteral calc. Does have large stone burden in the left kidney as well. Still with pain. On toradol   Pt in pain and wants them treated. Has had laser litho in the past with Dr. Kathy Ness. Options for stone treatment were discussed with the patientincluding ureteroscopy and laser lithotripsy, percutaneous nephrolithotomy, extracorporeal shockwave lithotripsy, and even medical expulsive therapy and a decision was made for Ureteroscopy and Laser Lithotripsy with stent     All risks, benefits and alternatives were discussed with the patient including but not limited to Death, Stroke, MI, bleeding, infection, injury to the urethra, bladder, ureter, kidney or any other adjacent organs. PLAN:     Pt would like treatment over medical expulsive therapy. She has had treatment before and is familiar with this. I discussed that we would try to treat her left ureteral stone as well as her stone burden on the left kidney as well. SHe is aware this may take 2 separate proceedures to get her stone free. I discussed medical workup after stone proceedure to try to find the medical reason for why she forms stones. 9/25/2020=PT HERE FOR F/U   PT PASSED HER STONE ON SUNDAY 9/20/2020   PT PAIN FREE TODAY   NO HEMATURIA   DENIES DYSURIA   PT DRINKING A LOT OF WATER   STATES THIS IS THE FIRST STONE SHE PASSED ON HER OWN   THIS MAKES HER 5TH STONE        ALLERGIES: No Allergies      MEDICATIONS: Urocit-K   Humalog   Losartan Potassium   Metformin Hcl   Synthroid   Zantac         PSH: Cysto/Uretero W/Lithotripsy - 2018  Cystoscopy Insert Stent - 2018  Hysterectomy, .          NON- PSH: No Non- PSH       PMH: Unspecified renal colic - 1/65/2990  Calculus of kidney (Stable) - 2018  Disorder rslt from impaired renal tubular function, unsp - 2018       NON- PMH: Type 1 diabetes mellitus without complications       FAMILY HISTORY: Diabetes - Runs in Family  HEART - Runs in Family     SOCIAL HISTORY: Marital Status: Single  Ethnicity: Not  Or ; Race: White  Current Smoking Status: Patient does not smoke anymore. Has not smoked since 7/1/2008. Tobacco Use Assessment Completed: Used Tobacco in last 30 days? Has never drank. Does not drink caffeine. 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 Notes: Pt lives at home with spouse who is in good health. REVIEW OF SYSTEMS:     Constitutional:   Patient denies fever, chills, and weight loss. Eyes:   Patient denies dry eyes, cataract(s), and wearing glasses. Ears, Nose, Mouth, Throat:   Patient denies hearing loss and dry mouth. Cardiovascular:   Patient denies chest pains, swollen ankles, and irregular heartbeat. Respiratory:   Patient denies shortness of breath, wheezing, and chronic cough. Gastrointestinal:   Patient denies abdominal pain, nausea/vomiting, and change in bowels. Genitourinary:   Patient denies incontinence, painful urination, blood in urine, burris cath in place, and s/p cath in place. Musculoskeletal:   Patient denies using cane, using walker, and using wheelchair. Integumentary/Skin:   Patient denies rash, persistent itching, and skin cancer history. Neurological:   Patient denies numbness, tingling, and dizziness. Hematologic/Lymphatic:   Patient denies swollen glands, abnormal bleeding, and history blood transfusion. VITAL SIGNS:         Weight 200 lb / 90.72 kg   Height 62 in / 157.48 cm   Temperature 96.4 F / 35.7 C   BMI 36.6 kg/m²     MULTI-SYSTEM PHYSICAL EXAMINATION:     Constitutional: Well-nourished. No physical deformities. Normally developed. Good grooming. Neck: Neck symmetrical, not swollen. Normal tracheal position. Respiratory: No labored breathing, no use of accessory muscles. Cardiovascular: Normal temperature, normal extremity pulses, no swelling, no varicosities. Lymphatic: No enlargement of neck, axillae, groin. Skin: No paleness, no jaundice, no cyanosis. No lesion, no ulcer, no rash. Neurologic / Psychiatric: Oriented to time, oriented to place, oriented to person. No depression, no anxiety, no agitation. Gastrointestinal: No mass, no tenderness, no rigidity, non obese abdomen. Eyes: Normal conjunctivae. Normal eyelids. Ears, Nose, Mouth, and Throat: Left ear no scars, no lesions, no masses. Right ear no scars, no lesions, no masses. Nose no scars, no lesions, no masses. Normal hearing. wearing a mask. Musculoskeletal: Normal gait and station of head and neck. PAST DATA REVIEWED:   Source Of History:  Patient   Records Review:   Previous Patient Records   Urine Test Review:   Urinalysis   X-Ray Review: C.TChris Abdomen/Pelvis: Reviewed Films. Reviewed Report. Discussed With Patient. PROCEDURES:            Urinalysis - Dipstick - 84527  Dipstick Dipstick Cont'd   Specimen: Voided Blood: Small   Appearance: Clear pH: 5.0   Color: Yellow Protein: Neg   Glucose: Neg Urobilinogen: Neg   Bilirubin: Neg Nitrites: Neg   Ketones: Neg Leukocyte Esterase: 1+       ASSESSMENT:       ICD-10 Details   1 :   Calculus of kidney - N20.0    2   Unspecified renal colic - A98      PLAN:       Schedule  X-Rays: Today 9/20/2020 - KUB                Notes:   Here for follow up of left 6 mm ureteral calculus. Pt was scheduled for surgery on monday but passed her stone over the weekend. Doing well now. No issues. Will send stone for stone analysis. Former Uric acid stone former. On UroCitK     PLAN:     Check KUB to see if we can see left renal stones. Increase Urocit K to 3 times a day. Hold on 24 hour urine   Stone for analysis. Depending on KUB will either schedule ESWL or laser litho. Main concern is the left sided stones.  Does have 1 lower pole right stone as well. All r/b/a were discussed with the patient about ESWL and Laser litho. Will call with KUB results and plan from there. \     Pt with KUB positive Left stones but also question of left ureteral stone. Will plan for cystoscopy, left retrograde pyelogram, left ESWL of renal stone without stent.

## 2020-10-08 NOTE — BRIEF OP NOTE
Brief Postoperative Note      Patient: Luis Bowling  YOB: 1965  MRN: [id number]    Date of Procedure: 10/8/2020    Pre-Op Diagnosis: LEFT RENAL STONES    Post-Op Diagnosis: Same       Procedure(s):  LEFT ESWL EXTRACORPOREAL SHOCK WAVE LITHOTRIPSY WITH CYSTO, LEFT STENT    Surgeon(s):  Laurie Roberts MD    Assistant:  * No surgical staff found *    Anesthesia: General    Estimated Blood Loss (mL): Minimal    Complications: None    Specimens:   * No specimens in log *    Implants:  Implant Name Type Inv.  Item Serial No.  Lot No. LRB No. Used Action   STENT URET 2001 [employer] PIGTL PRCFLX W/O GW [id number] Stent:Urological STENT URET [address] PIGTL PRCFLX W/O GW [id number]  [place] [employer]: Rashida Rodrigues [id number] Left 1 Implanted         Drains: * No LDAs found *    Findings: STONES    Electronically signed by Laurie Roberts MD on 10/8/2020 at 3:04 PM

## 2020-10-08 NOTE — OP NOTE
[address] [place] [address].  Urology Post-operative Note    Rehana Rahman  YOB: 1965  [id number]    Pre-operative Diagnosis: History of left ureteral calculus, left renal calculi    Post-operative Diagnosis:  Same    Procedure: Cystourethroscopy, left JJ ureteral stent insertion, left renal ESWL    Surgeon: Tomi Patricia MD    Anesthesia: General    Estimated Blood Loss:   Minimal    Complications: None    Drains: Left 6 Taiwanese by 28 cm JJ ureteral stent    Specimen(s): None    Clinical Note:   53-year-old female with recurrent nephrolithiasis. She is found on imaging recently to have a cluster of stones in the mid and lower pole calyces on the left as well as a stone in the left mid ureter. She believes she passed the stone in the left ureter recently. She presents for the above listed procedure. The risks and benefits of the procedure including but not limited to infection, bleeding, bladder perforation injury, possible need for stent with stent irritation, injury to the kidney or other abdominal organs etc. were discussed in detail and she elected to proceed    Operative Description:   She was taken to the operating room placed on the lithotripsy table in the supine position. She received IV Ancef preoperatively. Following induction of general anesthesia, she was repositioned into the dorsolithotomy position. Her external genitalia and abdomen were then prepped and draped sterilely. A 21 Taiwanese cystoscope with 30 degree lens was inserted per urethra into the bladder. There were no urethral strictures false passages or tumors. Panendoscopic valuation of the bladder showed no clots stones tumors or foreign bodies. Both ureteral orifice ease were in the usual position on the trigone and there was clear reflux of urine from each.  from KUB showed a cluster of stones in the midpole calyx and a smaller cluster in the lower pole calyx on the left.   There is no evidence of a left ureteral stone however. A 0.035 sensor wire was inserted through the scope and into the left ureteral orifice. This was followed up the ureter under fluoroscopic guidance. There did not appear to be evidence of a left ureteral stone. The wire was coiled in the upper pole. A 6 Senegalese by 28 cm JJ ureteral stent was then passed over the wire and into the left renal pelvis. The wire was removed. Fluoroscopy confirmed coiling of the stent proximally in the left renal pelvis and was visualized to coil distally in the bladder in good position. The bladder was emptied and the cystoscope was removed. She was then repositioned supine on the table. The large stone cluster was localized within the F2 focal point of the lithotripsy unit using intraoperative fluoroscopy. This received a total of 2000 shocks at a maximum energy level of 7 kV and fragmented well. Remaining 500 shocks was then applied to the small cluster of stones in the lower pole and they also fragmented well. She tolerated the procedure well, was extubated, was taken to the PACU in stable condition. She was discharged home with prescription for Percocet, ibuprofen, Pyridium, Cipro.   She will follow-up in a week with a KUB to see if her stent can be removed at that time which is likely      Summer Damon MD  10/8/2020  3:23 PM

## 2020-10-08 NOTE — ANESTHESIA PRE PROCEDURE
Department of Anesthesiology  Preprocedure Note       Name:  Darylene Michael   Age:  50 y.o.  :  1965                                          MRN:  [id number]         Date:  10/8/2020      Surgeon: Luma Crockett):  Stefanie Severance, MD    Procedure: Procedure(s):  LEFT ESWL EXTRACORPOREAL SHOCK WAVE LITHOTRIPSY WITH CYSTO, RETROGRADE PYELOGRAM - LEFT    Medications prior to admission:   Prior to Admission medications    Medication Sig Start Date End Date Taking? Authorizing Provider   loratadine (CLARITIN) 10 MG capsule Take 10 mg by mouth nightly   Yes Historical Provider, MD   losartan (COZAAR) 100 MG tablet Take 100 mg by mouth daily   Yes Historical Provider, MD   potassium citrate (UROCIT-K) 10 MEQ (1080 MG) extended release tablet Take 10 mEq by mouth 3 times daily (with meals)    Yes Historical Provider, MD   ketorolac (TORADOL) 10 MG tablet Take 1 tablet by mouth every 6 hours as needed for Pain First dose given in the ED. 20  Yes Miguel Earing, DO   ondansetron (ZOFRAN ODT) 4 MG disintegrating tablet Take 1 tablet by mouth every 8 hours as needed for Nausea or Vomiting 20 Yes Miguel Earing, DO   ranitidine (ZANTAC) 150 MG tablet Take 150 mg by mouth daily    Yes Historical Provider, MD   metFORMIN (GLUCOPHAGE) 1000 MG tablet Take 1,000 mg by mouth 2 times daily (with meals)   Yes Historical Provider, MD   insulin lispro (HUMALOG) 100 UNIT/ML injection vial Inject 0-6 Units into the skin 3 times daily (with meals) 10/28/15  Yes Mee Carroll MD   levothyroxine (SYNTHROID) 50 MCG tablet Take 50 mcg by mouth Daily.    Yes Historical Provider, MD       Current medications:    Current Facility-Administered Medications   Medication Dose Route Frequency Provider Last Rate Last Dose    0.9 % sodium chloride infusion   Intravenous Continuous OMAR Monet -  mL/hr at 10/08/20 1205      ceFAZolin (ANCEF) 2 g in sterile water 20 mL IV syringe  2 g Intravenous On Call to OR Nahomy Malik, APRN - CNP           Allergies:  No Known Allergies    Problem List:    Patient Active Problem List   Diagnosis Code    Acute renal failure (ARF) ([facility] [address] N17.9    Hyperglycemia R73.9    Hypertension I10    Leukocytosis D72.829    Ureteral calculi N20.1       Past Medical History:        Diagnosis Date    Acute renal failure (ARF) ([facility] [address] 10/25/2015    Diabetes mellitus ([facility] [address]     Fibroid, uterine abnormal cells on cervix craming and clotting    GERD (gastroesophageal reflux disease)     Hypertension 10/25/2015    Kidney stone     Obese     Thyroid disease        Past Surgical History:        Procedure Laterality Date    DILATION AND CURETTAGE OF UTERUS      HYSTERECTOMY  Jun 2013    Robotic Assisted Total Hysterectomy w/BSO, Cystoscopy    LITHOTRIPSY      [place] CYSTO/URETERO W/LITHOTRIPSY &INDWELL STENT INSRT Bilateral 7/7/2018    CYSTOSCOPY RETROGRADE PYELOGRAM STENT INSERTION performed by Oj Khoury MD at [address] Bilateral 7/25/2018    BILATERAL CYSTOSCOPY RETROGRADE PYELOGRAM, URETEROSCOPY, LASER LITHOTRIPSY WITH BILATERAL STENT CHANGE performed by jO Khoury MD at [address]         Social History:    Social History     Tobacco Use    Smoking status: Former Smoker     Years: 0.50    Smokeless tobacco: Never Used    Tobacco comment: quit smoking 10 yrs ago   Substance Use Topics    Alcohol use:  No                                Counseling given: Not Answered  Comment: quit smoking 10 yrs ago      Vital Signs (Current):   Vitals:    10/08/20 1132 10/08/20 1144   BP:  (!) 184/86   Pulse:  107   Resp:  20   Temp:  98.9 °F (37.2 °C)   TempSrc:  Temporal   SpO2:  97%   Weight: 200 lb (90.7 kg)    Height: 5' 2\" (1.575 m)                                               BP Readings from Last 3 Encounters:   10/08/20 (!) 184/86   09/17/20 (!) 145/73   04/12/19 110/76       NPO Status: Time of last liquid consumption: 2355                        Time of last solid consumption: 2345                        Date of last liquid consumption: 10/07/20                        Date of last solid food consumption: 10/07/20    BMI:   Wt Readings from Last 3 Encounters:   10/08/20 200 lb (90.7 kg)   09/17/20 200 lb (90.7 kg)   04/12/19 200 lb (90.7 kg)     Body mass index is 36.58 kg/m². CBC:   Lab Results   Component Value Date    WBC 5.3 10/08/2020    RBC 3.95 10/08/2020    HGB 11.0 10/08/2020    HCT 34.0 10/08/2020    MCV 86.1 10/08/2020    RDW 14.1 10/08/2020     10/08/2020       CMP:   Lab Results   Component Value Date     10/08/2020    K 4.9 10/08/2020    K 4.6 09/17/2020     10/08/2020    CO2 24 10/08/2020    BUN 24 10/08/2020    CREATININE 1.2 10/08/2020    GFRAA 57 10/08/2020    LABGLOM 47 10/08/2020    GLUCOSE 184 10/08/2020    PROT 6.1 07/10/2018    CALCIUM 10.4 10/08/2020    BILITOT 0.4 07/10/2018    ALKPHOS 58 07/10/2018    AST 16 07/10/2018    ALT 11 07/10/2018       POC Tests: No results for input(s): POCGLU, POCNA, POCK, POCCL, POCBUN, POCHEMO, POCHCT in the last 72 hours. Coags: No results found for: PROTIME, INR, APTT    HCG (If Applicable):   Lab Results   Component Value Date    PREGTESTUR NEGATIVE 06/04/2013        ABGs: No results found for: PHART, PO2ART, LXV6EFA, LYZ5FJM, BEART, L8TBYAUE     Type & Screen (If Applicable):  No results found for: LABABO, LABRH  EXAMINATION:    CT OF THE ABDOMEN AND PELVIS WITHOUT CONTRAST 9/17/2020 9:18 am         TECHNIQUE:    CT of the abdomen and pelvis was performed without the administration of    intravenous contrast. Multiplanar reformatted images are provided for review.     Dose modulation, iterative reconstruction, and/or weight based adjustment of    the mA/kV was utilized to reduce the radiation dose to as low as reasonably    achievable.         COMPARISON:    07/07/2018         HISTORY:    ORDERING SYSTEM PROVIDED HISTORY: left flank pain TECHNOLOGIST PROVIDED HISTORY:    Reason for exam:->left flank pain    Additional Contrast?->None         FINDINGS:    Lower Chest:  Visualized portion of the lower chest demonstrates no acute    abnormality.         Organs:  Liver enhances normally without evidence of intrahepatic biliary    ductal dilatation. The spleen, pancreas and adrenal glands are unremarkable.         There is moderate left hydronephrosis. Maci Bri is a 6 mm x 5 mm x 4 mm    obstructing calculus within the mid left ureter at the level of L3.  Multiple    nonobstructing left renal calculi are noted.  There is perinephric stranding. There is no left UVJ calculus. Maci Bri is no calculus within the urinary    bladder.         There is no right obstructive uropathy. Maci Bri is a 6 mm nonobstructing right    renal calculus.         GI/Bowel:  The gallbladder is unremarkable.   There is no evidence of bowel    obstruction.  No evidence of abnormal bowel wall thickening or distension.         Pelvis: There is no pelvic mass or pelvic lymphadenopathy         Peritoneum/Retroperitoneum:  No evidence of retroperitoneal lymphadenopathy. .    The abdominal aorta is normal caliber         Bones/Soft Tissues:  No acute abnormality of the visualized osseous    structures.              Impression    1. 6 mm x 5 mm x 4 mm obstructing calculus within the mid left ureter causing    moderate left hydroureteronephrosis. 2. Multiple nonobstructing left renal calculi.     3. 6 mm nonobstructing right renal calculus             Anesthesia Evaluation  Patient summary reviewed and Nursing notes reviewed no history of anesthetic complications:   Airway: Mallampati: I  TM distance: >3 FB   Neck ROM: full  Mouth opening: > = 3 FB Dental:      Comment: None loose    Pulmonary: breath sounds clear to auscultation      (-) COPD and not a current smoker                           Cardiovascular:  Exercise tolerance: good (>4 METS),   (+) hypertension:,     (-) past MI and CAD    ECG reviewed  Rhythm: regular  Rate: normal                    Neuro/Psych:   Negative Neuro/Psych ROS              GI/Hepatic/Renal:   (+) hiatal hernia, GERD: well controlled, renal disease: kidney stones and CRI, morbid obesity          Endo/Other:    (+) DiabetesType II DM, using insulin, hypothyroidism::., .                 Abdominal:   (+) obese,         Vascular: negative vascular ROS. 10/8/2020 12:46 PM - Lyndon, Mhy Incoming Ekg Results From [facility]     Component  Value  Ref Range & Units  Status  Collected  Lab    Ventricular Rate  100  BPM  Incomplete  10/08/2020 11:52 AM  [facility]    Atrial Rate  100  BPM  Incomplete  10/08/2020 11:52 AM  [facility]    P-R Interval  144  ms  Incomplete  10/08/2020 11:52 AM  [facility]    QRS Duration  78  ms  Incomplete  10/08/2020 11:52 AM  [facility]    Q-T Interval  330  ms  Incomplete  10/08/2020 11:52 AM  [facility]    QTc Calculation (Bazett)  425  ms  Incomplete  10/08/2020 11:52 AM  [facility]    P Axis  45  degrees  Incomplete  10/08/2020 11:52 AM  [facility]    R Axis  -11  degrees  Incomplete  10/08/2020 11:52 AM  [facility]    T Axis  7  degrees  Incomplete  10/08/2020 11:52 AM  [facility]    Testing Performed By     Lab - Abbreviation  Name  Director  Address  Valid Date Range    360-[facility]  [facility]  Unknown  Unknown  04/18/16 0721-Present    Narrative & Impression     Normal sinus rhythm  Inferior infarct , age undetermined  Abnormal ECG  No previous ECGs available            Anesthesia Plan      general     ASA 3       Induction: intravenous. BIS  MIPS: Postoperative opioids intended, Prophylactic antiemetics administered and Postoperative trial extubation. Anesthetic plan and risks discussed with patient. Use of blood products discussed with patient whom consented to blood products. Plan discussed with CRNA.                 Elpidio Guzman DO   10/8/2020 2:02 PM

## 2020-10-15 ENCOUNTER — HOSPITAL ENCOUNTER (OUTPATIENT)
Age: 55
Discharge: HOME | End: 2020-10-17
Payer: COMMERCIAL

## 2020-10-15 ENCOUNTER — HOSPITAL ENCOUNTER (OUTPATIENT)
Dept: GENERAL RADIOLOGY | Age: 55
Discharge: HOME | End: 2020-10-17
Payer: COMMERCIAL

## 2020-10-15 PROCEDURE — 74018 RADEX ABDOMEN 1 VIEW: CPT

## 2023-05-13 ENCOUNTER — HOSPITAL ENCOUNTER (OUTPATIENT)
Age: 58
End: 2023-05-13
Payer: COMMERCIAL

## 2023-05-13 ENCOUNTER — HOSPITAL ENCOUNTER (OUTPATIENT)
Dept: GENERAL RADIOLOGY | Age: 58
End: 2023-05-13
Payer: COMMERCIAL

## 2023-05-13 DIAGNOSIS — N20.0 CALCULUS OF KIDNEY: ICD-10-CM

## 2023-05-13 PROCEDURE — 74018 RADEX ABDOMEN 1 VIEW: CPT

## 2023-08-14 ENCOUNTER — HOSPITAL ENCOUNTER (EMERGENCY)
Age: 58
Discharge: HOME | End: 2023-08-14
Payer: COMMERCIAL

## 2023-08-14 VITALS
SYSTOLIC BLOOD PRESSURE: 144 MMHG | TEMPERATURE: 98.6 F | DIASTOLIC BLOOD PRESSURE: 70 MMHG | HEART RATE: 94 BPM | OXYGEN SATURATION: 100 % | RESPIRATION RATE: 18 BRPM

## 2023-08-14 DIAGNOSIS — M54.32 BILATERAL SCIATICA: Primary | ICD-10-CM

## 2023-08-14 DIAGNOSIS — M54.31 BILATERAL SCIATICA: Primary | ICD-10-CM

## 2023-08-14 DIAGNOSIS — M54.50 ACUTE EXACERBATION OF CHRONIC LOW BACK PAIN: ICD-10-CM

## 2023-08-14 DIAGNOSIS — G89.29 ACUTE EXACERBATION OF CHRONIC LOW BACK PAIN: ICD-10-CM

## 2023-08-14 PROCEDURE — 96372 THER/PROPH/DIAG INJ SC/IM: CPT

## 2023-08-14 PROCEDURE — 6360000002 HC RX W HCPCS: Performed by: NURSE PRACTITIONER

## 2023-08-14 PROCEDURE — 99211 OFF/OP EST MAY X REQ PHY/QHP: CPT

## 2023-08-14 RX ORDER — KETOROLAC TROMETHAMINE 30 MG/ML
30 INJECTION, SOLUTION INTRAMUSCULAR; INTRAVENOUS ONCE
Status: COMPLETED | OUTPATIENT
Start: 2023-08-14 | End: 2023-08-14

## 2023-08-14 RX ORDER — METHOCARBAMOL 500 MG/1
500 TABLET, FILM COATED ORAL 4 TIMES DAILY
Qty: 20 TABLET | Refills: 0 | Status: SHIPPED | OUTPATIENT
Start: 2023-08-14 | End: 2023-08-19

## 2023-08-14 RX ORDER — PREDNISONE 10 MG/1
TABLET ORAL
Qty: 20 TABLET | Refills: 0 | Status: SHIPPED | OUTPATIENT
Start: 2023-08-14 | End: 2023-08-24

## 2023-08-14 RX ORDER — NAPROXEN 500 MG/1
500 TABLET ORAL 2 TIMES DAILY PRN
Qty: 28 TABLET | Refills: 0 | Status: SHIPPED | OUTPATIENT
Start: 2023-08-14

## 2023-08-14 RX ADMIN — KETOROLAC TROMETHAMINE 30 MG: 30 INJECTION, SOLUTION INTRAMUSCULAR; INTRAVENOUS at 12:44

## 2023-08-14 ASSESSMENT — PAIN DESCRIPTION - DESCRIPTORS: DESCRIPTORS: ACHING

## 2023-08-14 ASSESSMENT — PAIN DESCRIPTION - ORIENTATION: ORIENTATION: LOWER

## 2023-08-14 ASSESSMENT — PAIN SCALES - GENERAL: PAINLEVEL_OUTOF10: 7

## 2023-10-07 ENCOUNTER — HOSPITAL ENCOUNTER (EMERGENCY)
Age: 58
Discharge: HOME | End: 2023-10-07
Payer: COMMERCIAL

## 2023-10-07 VITALS
OXYGEN SATURATION: 100 % | TEMPERATURE: 98.4 F | RESPIRATION RATE: 18 BRPM | HEART RATE: 88 BPM | HEIGHT: 62 IN | WEIGHT: 199 LBS | BODY MASS INDEX: 36.62 KG/M2 | DIASTOLIC BLOOD PRESSURE: 72 MMHG | SYSTOLIC BLOOD PRESSURE: 134 MMHG

## 2023-10-07 DIAGNOSIS — R11.2 NAUSEA AND VOMITING, UNSPECIFIED VOMITING TYPE: Primary | ICD-10-CM

## 2023-10-07 DIAGNOSIS — R19.7 DIARRHEA, UNSPECIFIED TYPE: ICD-10-CM

## 2023-10-07 LAB
BASOPHILS # BLD: 0.01 K/UL (ref 0–0.2)
BASOPHILS NFR BLD: 0 % (ref 0–2)
BUN BLD-MCNC: 34 MG/DL (ref 6–20)
CHLORIDE BLD-SCNC: 102 MMOL/L (ref 100–108)
CO2 BLD CALC-SCNC: 27 MMOL/L (ref 22–29)
CREAT BLD-MCNC: 1.1 MG/DL (ref 0.5–1)
EGFR, POC: 59 ML/MIN/1.73M2
EOSINOPHIL # BLD: 0.1 K/UL (ref 0.05–0.5)
EOSINOPHILS RELATIVE PERCENT: 2 % (ref 0–6)
ERYTHROCYTE [DISTWIDTH] IN BLOOD BY AUTOMATED COUNT: 13.1 % (ref 11.5–15)
GLUCOSE BLD-MCNC: 105 MG/DL (ref 74–99)
HCT VFR BLD AUTO: 34.1 % (ref 34–48)
HGB BLD-MCNC: 11.3 G/DL (ref 11.5–15.5)
IMM GRANULOCYTES # BLD AUTO: <0.03 K/UL (ref 0–0.58)
IMM GRANULOCYTES NFR BLD: 0 % (ref 0–5)
LYMPHOCYTES NFR BLD: 0.62 K/UL (ref 1.5–4)
LYMPHOCYTES RELATIVE PERCENT: 10 % (ref 20–42)
MCH RBC QN AUTO: 28.7 PG (ref 26–35)
MCHC RBC AUTO-ENTMCNC: 33.1 G/DL (ref 32–34.5)
MCV RBC AUTO: 86.5 FL (ref 80–99.9)
MONOCYTES NFR BLD: 0.34 K/UL (ref 0.1–0.95)
MONOCYTES NFR BLD: 6 % (ref 2–12)
NEUTROPHILS NFR BLD: 82 % (ref 43–80)
NEUTS SEG NFR BLD: 5.14 K/UL (ref 1.8–7.3)
PLATELET # BLD AUTO: 202 K/UL (ref 130–450)
PMV BLD AUTO: 10.1 FL (ref 7–12)
POC ANION GAP: 9 MMOL/L (ref 7–16)
POTASSIUM BLD-SCNC: 4.8 MMOL/L (ref 3.5–5)
RBC # BLD AUTO: 3.94 M/UL (ref 3.5–5.5)
SODIUM BLD-SCNC: 138 MMOL/L (ref 132–146)
WBC OTHER # BLD: 6.2 K/UL (ref 4.5–11.5)

## 2023-10-07 PROCEDURE — 85025 COMPLETE CBC W/AUTO DIFF WBC: CPT

## 2023-10-07 PROCEDURE — 36415 COLL VENOUS BLD VENIPUNCTURE: CPT

## 2023-10-07 PROCEDURE — 80051 ELECTROLYTE PANEL: CPT

## 2023-10-07 PROCEDURE — 82565 ASSAY OF CREATININE: CPT

## 2023-10-07 PROCEDURE — 6370000000 HC RX 637 (ALT 250 FOR IP): Performed by: NURSE PRACTITIONER

## 2023-10-07 PROCEDURE — 82947 ASSAY GLUCOSE BLOOD QUANT: CPT

## 2023-10-07 PROCEDURE — 84520 ASSAY OF UREA NITROGEN: CPT

## 2023-10-07 PROCEDURE — 99211 OFF/OP EST MAY X REQ PHY/QHP: CPT

## 2023-10-07 RX ORDER — ONDANSETRON 4 MG/1
4 TABLET, ORALLY DISINTEGRATING ORAL ONCE
Status: COMPLETED | OUTPATIENT
Start: 2023-10-07 | End: 2023-10-07

## 2023-10-07 RX ORDER — ONDANSETRON 4 MG/1
4 TABLET, ORALLY DISINTEGRATING ORAL EVERY 8 HOURS PRN
Qty: 10 TABLET | Refills: 0 | Status: SHIPPED | OUTPATIENT
Start: 2023-10-07

## 2023-10-07 RX ADMIN — ONDANSETRON 4 MG: 4 TABLET, ORALLY DISINTEGRATING ORAL at 10:01

## 2024-03-20 NOTE — ED PROVIDER NOTES
Department of Emergency  [facility] [address] [facility]  Provider Note  Admit Date/Time: 10/7/2023  9:27 AM  Room:   NAME: Jonathan Chávez  : 1965  MRN: [id number]     Chief Complaint:  Nausea (Emesis and diarrhea since last sat   last emesis  430 this morning )    History of Present Illness        Jonathan Chávez is a 66 y.o. female who has a past medical history of:   Past Medical History:   Diagnosis Date    Acute renal failure (ARF) ([address]) 10/25/2015    Diabetes mellitus ([address])     Fibroid, uterine abnormal cells on cervix craming and clotting    GERD (gastroesophageal reflux disease)     Hypertension 10/25/2015    Kidney stone     Obese     Thyroid disease     presents to the urgent care center by private car for evaluation. .  She has had problems with nausea vomiting and diarrhea for the past week. She had an emesis at 430 this morning. She said the number of diarrheal stools varies daily. She said she has no appetite and has been  trying to drink water at least to keep hydrated. Does not have a fever does not have any abdominal pain does not have any chest pain cough or shortness of breath. Denies any urinary symptoms. ROS    Pertinent positives and negatives are stated within HPI, all other systems reviewed and are negative.     Past Surgical History:   Procedure Laterality Date    DILATION AND CURETTAGE OF UTERUS      HYSTERECTOMY (CERVIX STATUS UNKNOWN)  2013    Robotic Assisted Total Hysterectomy w/BSO, Cystoscopy    LITHOTRIPSY      LITHOTRIPSY Left 10/8/2020    LEFT ESWL EXTRACORPOREAL SHOCK WAVE LITHOTRIPSY WITH CYSTO, LEFT STENT performed by Sanket Jolley MD at [address] [facility]. W/URETEROSCOPY W/LITHOTRIPSY Bilateral 2018    BILATERAL CYSTOSCOPY RETROGRADE PYELOGRAM, URETEROSCOPY, LASER LITHOTRIPSY WITH BILATERAL STENT CHANGE performed by Vinod Singh MD at [address] [facility] [address] &INDWELL STENT INSRT Bilateral 2018 Please follow-up with your primary care doctor in 1 to 2 days.  Please return to the emergency department if you have worsening symptoms or other concerns.      Neck Pain    WHAT YOU NEED TO KNOW:    You may have sudden neck pain that increases quickly. You may instead feel pain build slowly over time. Neck pain may go away in a few days or weeks, or it may continue for months. The pain may come and go, or be worse with certain movements. The pain may be only in your neck, or it may move to your arms, back, or shoulders. You may also have pain that starts in another body area and moves to your neck. Some types of neck pain are permanent.    DISCHARGE INSTRUCTIONS:    Return to the emergency department if:   •You have an injury that causes neck pain and shooting pain down your arms or legs.  •Your neck pain suddenly becomes severe.  •You have neck pain along with numbness, tingling, or weakness in your arms or legs.  •You have a stiff neck, a headache, and a fever.    Contact your healthcare provider if:   •You have new or worsening symptoms.  •Your symptoms continue even after treatment.  •You have questions or concerns about your condition or care.    Medicines: You may need any of the following:   •NSAIDs , such as ibuprofen, help decrease swelling, pain, and fever. This medicine is available without a doctor's order. Ask your healthcare provider which medicine to take and how often to take it. Follow directions. NSAIDs can cause stomach bleeding or kidney problems if not taken correctly. If you take blood thinner medicine, always ask if NSAIDs are safe for you.  •Acetaminophen helps decrease pain and fever. Ask your healthcare provider how much to take and how often to take it. Follow directions. Acetaminophen can cause liver damage if not taken correctly.  •Steroid medicine may be used to reduce inflammation. This can help relieve pain caused by swelling.  •Take your medicine as directed. Contact your healthcare provider if you think your medicine is not helping or if you have side effects. Tell him or her if you are allergic to any medicine. Keep a list of the medicines, vitamins, and herbs you take. Include the amounts, and when and why you take them. Bring the list or the pill bottles to follow-up visits. Carry your medicine list with you in case of an emergency.    Manage or prevent neck pain:   •Rest your neck as directed. Do not make sudden movements, such as turning your head quickly. Your healthcare provider may recommend you wear a cervical collar for a short time. The collar will prevent you from moving your head. This will give your neck time to heal if an injury is causing your neck pain. Ask your healthcare provider when you can return to sports or other normal daily activities.  •Apply heat as directed. Heat helps relieve pain and swelling. Use a heat wrap, or soak a small towel in warm water. Wring out the extra water. Apply the heat wrap or towel for 20 minutes every hour, or as directed.  •Apply ice as directed. Ice helps relieve pain and swelling, and can help prevent tissue damage. Use an ice pack, or put ice in a bag. Cover the ice pack or back with a towel before you apply it to your neck. Apply the ice pack or ice for 15 minutes every hour, or as directed. Your healthcare provider can tell you how often to apply ice.  •Do neck exercises as directed. Neck exercises help strengthen the muscles and increase range of motion. Your healthcare provider will tell you which exercises are right for you. He may give you instructions, or he may recommend that you work with a physical therapist. Your healthcare provider or therapist can make sure you are doing the exercises correctly.   •Maintain good posture. Try to keep your head and shoulders lifted when you sit. If you work in front of a computer, make sure the monitor is at the right level. You should not need to look up down to see the screen. You should also not have to lean forward to be able to read what is on the screen. Make sure your keyboard, mouse, and other computer items are placed where you do not have to extend your shoulder to reach them. Get up often if you work in front of a computer or sit for long periods of time. Stretch or walk around to keep your neck muscles loose.    Follow up with your healthcare provider as directed: Your healthcare provider may refer you to a specialist if your pain does not get better with treatment. Write down your questions so you remember to ask them during your visits.
